# Patient Record
Sex: FEMALE | Race: WHITE | NOT HISPANIC OR LATINO | Employment: UNEMPLOYED | URBAN - NONMETROPOLITAN AREA
[De-identification: names, ages, dates, MRNs, and addresses within clinical notes are randomized per-mention and may not be internally consistent; named-entity substitution may affect disease eponyms.]

---

## 2017-02-27 RX ORDER — ATORVASTATIN CALCIUM 20 MG/1
TABLET, FILM COATED ORAL
Qty: 30 TABLET | Refills: 10 | Status: CANCELLED | OUTPATIENT
Start: 2017-02-27

## 2017-02-27 RX ORDER — ATORVASTATIN CALCIUM 20 MG/1
20 TABLET, FILM COATED ORAL DAILY
Qty: 30 TABLET | Refills: 6 | Status: SHIPPED | OUTPATIENT
Start: 2017-02-27 | End: 2017-10-09 | Stop reason: SDUPTHER

## 2017-03-14 ENCOUNTER — APPOINTMENT (OUTPATIENT)
Dept: LAB | Facility: HOSPITAL | Age: 52
End: 2017-03-14

## 2017-03-14 ENCOUNTER — OFFICE VISIT (OUTPATIENT)
Dept: PAIN MEDICINE | Facility: CLINIC | Age: 52
End: 2017-03-14

## 2017-03-14 VITALS
HEIGHT: 68 IN | SYSTOLIC BLOOD PRESSURE: 140 MMHG | DIASTOLIC BLOOD PRESSURE: 80 MMHG | WEIGHT: 226.6 LBS | BODY MASS INDEX: 34.34 KG/M2

## 2017-03-14 DIAGNOSIS — Z79.899 HIGH RISK MEDICATIONS (NOT ANTICOAGULANTS) LONG-TERM USE: ICD-10-CM

## 2017-03-14 DIAGNOSIS — M54.42 CHRONIC BILATERAL LOW BACK PAIN WITH LEFT-SIDED SCIATICA: Primary | ICD-10-CM

## 2017-03-14 DIAGNOSIS — G89.29 CHRONIC BILATERAL LOW BACK PAIN WITH LEFT-SIDED SCIATICA: Primary | ICD-10-CM

## 2017-03-14 DIAGNOSIS — M50.30 DDD (DEGENERATIVE DISC DISEASE), CERVICAL: ICD-10-CM

## 2017-03-14 PROCEDURE — G0481 DRUG TEST DEF 8-14 CLASSES: HCPCS | Performed by: PAIN MEDICINE

## 2017-03-14 PROCEDURE — 80307 DRUG TEST PRSMV CHEM ANLYZR: CPT | Performed by: PAIN MEDICINE

## 2017-03-14 PROCEDURE — 99204 OFFICE O/P NEW MOD 45 MIN: CPT | Performed by: PAIN MEDICINE

## 2017-03-14 RX ORDER — POTASSIUM CITRATE 15 MEQ/1
TABLET, EXTENDED RELEASE ORAL
COMMUNITY
Start: 2016-12-09 | End: 2017-05-25

## 2017-03-14 RX ORDER — HYDROCODONE BITARTRATE AND ACETAMINOPHEN 7.5; 325 MG/1; MG/1
1 TABLET ORAL 3 TIMES DAILY
Qty: 90 TABLET | Refills: 0 | Status: SHIPPED | OUTPATIENT
Start: 2017-03-14 | End: 2017-04-13

## 2017-03-14 RX ORDER — VENLAFAXINE 100 MG/1
TABLET ORAL
COMMUNITY
Start: 2017-02-27 | End: 2017-05-25

## 2017-03-14 RX ORDER — TAMSULOSIN HYDROCHLORIDE 0.4 MG/1
CAPSULE ORAL
COMMUNITY
Start: 2016-12-21 | End: 2017-05-25

## 2017-03-14 RX ORDER — LISINOPRIL 40 MG/1
40 TABLET ORAL DAILY
COMMUNITY
Start: 2017-02-10 | End: 2019-01-16

## 2017-03-14 RX ORDER — PROMETHAZINE HYDROCHLORIDE 25 MG/1
TABLET ORAL
COMMUNITY
Start: 2016-12-13 | End: 2017-05-25

## 2017-03-14 RX ORDER — CEFUROXIME AXETIL 250 MG/1
TABLET ORAL
COMMUNITY
Start: 2016-12-09 | End: 2017-05-25

## 2017-03-14 RX ORDER — ISOSORBIDE MONONITRATE 30 MG/1
TABLET, EXTENDED RELEASE ORAL
COMMUNITY
Start: 2017-02-10 | End: 2017-05-19 | Stop reason: SDUPTHER

## 2017-03-14 NOTE — PROGRESS NOTES
"Sena Romero is a 52 y.o. female.   1965    HPI:   Location: lower back and bilateral leg and neck  Quality: stabbing, shooting, aching and sharp  Severity: 6/10  Timing: constant  Alleviating: pain medication  Aggravating: increased activity    Several areas of pain.  Low back and left leg are worst.  States she has had pain going down the left leg for a couple of weeks.  The back pain has been present for about 20 years no injury involved.  She has neck pain and has an mri from 2014 which calls it \"severe canal stenosis\".  Has not seen surgeon about neck.  Her neck pain is isolated to axial pain without radicular sx's.  Is on plavix for cad.  Last had stents in 3/16.  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  Has seen [facility] in the past, but stopped because she did not feel as though they were helping her.  She receives opioid medication currently from her pcp.  She has been on opioids, in general, for about 5 years.  Denies being d/c'd for aberrant behavior.  Denies abberant UDS's in the past.      The following portions of the patient's history were reviewed by me and updated as appropriate: allergies, current medications, past family history, past medical history, past social history, past surgical history and problem list.    Past Medical History   Diagnosis Date   • Chronic back pain      And leg, DJD      • Chronic depression 09/18/2014     With anxiety   • Chronic pain disorder    • Coronary arteriosclerosis 09/18/2014     [place] NSTEMI      • Costal chondritis 07/02/2014   • Dyslipidemia    • Essential hypertension 06/17/2014   • GERD (gastroesophageal reflux disease)    • Hematoma of groin 03/13/2015   • Low back pain    • Obese 07/02/2014   • Pain of sternum 09/18/2014   • Pseudoaneurysm of femoral artery 03/13/2015     Post-catheterization femoral pseudoaneurysm     • Surgical follow-up care 09/18/2014     CABG X 3 " 3/23/14          Social History     Social History   • Marital status:      Spouse name: N/A   • Number of children: N/A   • Years of education: N/A     Occupational History   • Not on file.     Social History Main Topics   • Smoking status: Current Every Day Smoker     Packs/day: 0.50     Types: Cigarettes   • Smokeless tobacco: Not on file      Comment: Quit  3/1/15.  Smoked for 40 years   • Alcohol use No   • Drug use: No   • Sexual activity: Defer     Other Topics Concern   • Not on file     Social History Narrative       Family History   Problem Relation Age of Onset   • Diabetes Mother    • Mental illness Father    • COPD Sister    • Osteoporosis Sister    • Cancer Maternal Aunt    • Diabetes Maternal Aunt    • Diabetes Maternal Uncle    • Coronary artery disease Neg Hx          Current Outpatient Prescriptions:   •  ASPIRIN PO, Take 1 tablet by mouth daily., Disp: , Rfl:   •  atorvastatin (LIPITOR) 20 MG tablet, Take 1 tablet by mouth Daily., Disp: 30 tablet, Rfl: 6  •  carvedilol (COREG) 3.125 MG tablet, Take 3.125 mg by mouth 2 (two) times a day., Disp: , Rfl:   •  clonazePAM (KlonoPIN) 1 MG disintegrating tablet, Take 1 mg by mouth at night as needed., Disp: , Rfl:   •  clopidogrel (PLAVIX) 75 MG tablet, TAKE ONE TABLET BY MOUTH DAILY, Disp: 30 tablet, Rfl: 4  •  HYDROcodone-acetaminophen (NORCO) 7.5-325 MG per tablet, Take 1 tablet by mouth 4 (four) times a day as needed., Disp: , Rfl:   •  Isosorbide Mononitrate (IMDUR PO), Take 1 tablet by mouth daily., Disp: , Rfl:   •  latanoprost (XALATAN) 0.005 % ophthalmic solution, Apply 1 drop to eye every night., Disp: , Rfl:   •  lisinopril (PRINIVIL,ZESTRIL) 10 MG tablet, Take 10 mg by mouth 2 (two) times a day., Disp: , Rfl:   •  omeprazole (PriLOSEC) 20 MG capsule, , Disp: , Rfl:   •  spironolactone-hydrochlorothiazide (ALDACTAZIDE) 25-25 MG tablet, TAKE ONE TABLET BY MOUTH EVERY MORNING, Disp: 30 tablet, Rfl: 5  •  Tiotropium Bromide Monohydrate  (SPIRIVA HANDIHALER IN), Inhale daily as needed (1 inhalation as directed Inhalation PRN)., Disp: , Rfl:   •  traZODone (DESYREL) 50 MG tablet, , Disp: , Rfl:   •  venlafaxine (EFFEXOR) 75 MG tablet, , Disp: , Rfl:   •  vitamin D (ERGOCALCIFEROL) 04408 UNITS capsule capsule, , Disp: , Rfl:   •  cefuroxime (CEFTIN) 250 MG tablet, , Disp: , Rfl:   •  HYDROcodone-acetaminophen (NORCO) 7.5-325 MG per tablet, Take 1 tablet by mouth 3 (Three) Times a Day for 30 days., Disp: 90 tablet, Rfl: 0  •  HYDROcodone-acetaminophen (NORCO) 7.5-325 MG per tablet, Take 1 tablet by mouth 3 (Three) Times a Day for 30 days., Disp: 90 tablet, Rfl: 0  •  isosorbide mononitrate (IMDUR) 30 MG 24 hr tablet, , Disp: , Rfl:   •  lisinopril (PRINIVIL,ZESTRIL) 40 MG tablet, , Disp: , Rfl:   •  Potassium Citrate ER 15 MEQ (1620 MG) tablet controlled-release, , Disp: , Rfl:   •  promethazine (PHENERGAN) 25 MG tablet, , Disp: , Rfl:   •  tamsulosin (FLOMAX) 0.4 MG capsule 24 hr capsule, , Disp: , Rfl:   •  venlafaxine (EFFEXOR) 100 MG tablet, , Disp: , Rfl:   •  VENTOLIN  (90 BASE) MCG/ACT inhaler, , Disp: , Rfl:     No Known Allergies      Review of Systems   Respiratory:        Copd     Cardiovascular:        Cabg     Musculoskeletal: Positive for back pain.   Psychiatric/Behavioral: Positive for dysphoric mood. The patient is nervous/anxious.      10 system review of systems was reviewed and negative except for above.    Physical Exam   Constitutional: She is oriented to person, place, and time. She appears well-developed and well-nourished. She does not appear ill. No distress.   HENT:   Head: Normocephalic and atraumatic.   Right Ear: Hearing normal.   Left Ear: Hearing normal.   Eyes: Conjunctivae and EOM are normal. Pupils are equal, round, and reactive to light.   Neck: Full passive range of motion without pain. No muscular tenderness present. Normal range of motion present.   Cardiovascular: Normal rate, regular rhythm and normal  heart sounds.    Pulmonary/Chest: Effort normal and breath sounds normal.   Sternal defect palpable   Abdominal: Soft. Bowel sounds are normal. There is no tenderness.   Musculoskeletal:        Cervical back: She exhibits decreased range of motion (full flexion and ext with pain during ext. ).        Lumbar back: She exhibits decreased range of motion (60 deg flexion and 5-10 deg ext.  ).   Neurological: She is alert and oriented to person, place, and time. She has normal strength and normal reflexes. She displays normal reflexes. No cranial nerve deficit or sensory deficit. She exhibits normal muscle tone. Coordination and gait normal.   Pos slr on left   Skin: Skin is warm and dry. No rash noted. No erythema.   Psychiatric: She has a normal mood and affect. Her behavior is normal. Judgment normal.   Vitals reviewed.      Sena was seen today for back pain and extremity pain.    Diagnoses and all orders for this visit:    Chronic bilateral low back pain with left-sided sciatica  -     ToxASSURE Select 13 (MW)    DDD (degenerative disc disease), cervical  -     ToxASSURE Select 13 (MW)    High risk medications (not anticoagulants) long-term use  -     ToxASSURE Select 13 (MW)    Other orders  -     HYDROcodone-acetaminophen (NORCO) 7.5-325 MG per tablet; Take 1 tablet by mouth 3 (Three) Times a Day for 30 days.  -     HYDROcodone-acetaminophen (NORCO) 7.5-325 MG per tablet; Take 1 tablet by mouth 3 (Three) Times a Day for 30 days.        Medication: Patient reports no negative side effects, Patient reports appropriate usage and storage habits, Patient's opioid provides enough reflief to be more active and perform activities of daily living with less discomfort., Refill opioid medication as above and Opioid contract was read and discussed today and the patient chooses to sign.  Request old [facility] records to confirm no aberrant behavior.     Interventional: none at this time.  Chronically on plavix.  May revisit in  future.  Watchful waiting on the recent start of left leg pain.  Neurologically intact.  Informed of alarm symptoms for cauda equina syndrome and to seek emergent care if she experiences.     Rehab: none at this time.  Has had issues with this in past due to sternal non-union reportedly.    Behavioral: No aberrant behavior noted. JESENIA Report #[id number]  was reviewed and is consistent with stated history.  Treated for depression with ssri and sees counseling at [facility] every two months.  States she feels as though her depression is well treated and denies any SI.    Urine drug screen Ordered today to test for drugs of abuse and prescribed medications    ORT: 1    PHQ-9: 9          This document has been electronically signed by Sundeep Gerard MD on March 14, 2017 8:59 AM

## 2017-03-20 LAB
CONV REPORT SUMMARY: NORMAL
Lab: NORMAL

## 2017-05-01 RX ORDER — CLOPIDOGREL BISULFATE 75 MG/1
75 TABLET ORAL DAILY
Qty: 30 TABLET | Refills: 4 | Status: SHIPPED | OUTPATIENT
Start: 2017-05-01 | End: 2017-10-09 | Stop reason: SDUPTHER

## 2017-05-01 RX ORDER — CLOPIDOGREL BISULFATE 75 MG/1
TABLET ORAL
Qty: 30 TABLET | Refills: 3 | Status: CANCELLED | OUTPATIENT
Start: 2017-05-01

## 2017-05-19 RX ORDER — ISOSORBIDE MONONITRATE 30 MG/1
TABLET, EXTENDED RELEASE ORAL
Qty: 30 TABLET | Refills: 4 | Status: SHIPPED | OUTPATIENT
Start: 2017-05-19 | End: 2017-06-20

## 2017-05-19 RX ORDER — SPIRONOLACTONE AND HYDROCHLOROTHIAZIDE 25; 25 MG/1; MG/1
TABLET ORAL
Qty: 30 TABLET | Refills: 4 | Status: SHIPPED | OUTPATIENT
Start: 2017-05-19 | End: 2017-05-25

## 2017-05-25 ENCOUNTER — OFFICE VISIT (OUTPATIENT)
Dept: ENDOCRINOLOGY | Facility: CLINIC | Age: 52
End: 2017-05-25

## 2017-05-25 VITALS
HEIGHT: 68 IN | SYSTOLIC BLOOD PRESSURE: 138 MMHG | WEIGHT: 216 LBS | HEART RATE: 58 BPM | BODY MASS INDEX: 32.74 KG/M2 | DIASTOLIC BLOOD PRESSURE: 80 MMHG

## 2017-05-25 DIAGNOSIS — E55.9 VITAMIN D DEFICIENCY: ICD-10-CM

## 2017-05-25 DIAGNOSIS — E21.0 PRIMARY HYPERPARATHYROIDISM (HCC): Primary | ICD-10-CM

## 2017-05-25 DIAGNOSIS — N20.0 NEPHROLITHIASIS: ICD-10-CM

## 2017-05-25 PROBLEM — I25.810 CORONARY ARTERY DISEASE INVOLVING CORONARY BYPASS GRAFT OF NATIVE HEART WITHOUT ANGINA PECTORIS: Status: ACTIVE | Noted: 2017-05-25

## 2017-05-25 PROCEDURE — 99245 OFF/OP CONSLTJ NEW/EST HI 55: CPT | Performed by: INTERNAL MEDICINE

## 2017-05-25 RX ORDER — SPIRONOLACTONE 25 MG/1
25 TABLET ORAL DAILY
Qty: 30 TABLET | Refills: 11 | Status: SHIPPED | OUTPATIENT
Start: 2017-05-25 | End: 2018-05-25

## 2017-05-25 RX ORDER — ERGOCALCIFEROL 1.25 MG/1
50000 CAPSULE ORAL
COMMUNITY
End: 2017-06-20

## 2017-06-20 ENCOUNTER — OFFICE VISIT (OUTPATIENT)
Dept: CARDIOLOGY | Facility: CLINIC | Age: 52
End: 2017-06-20

## 2017-06-20 VITALS
SYSTOLIC BLOOD PRESSURE: 170 MMHG | HEART RATE: 66 BPM | BODY MASS INDEX: 33.19 KG/M2 | WEIGHT: 219 LBS | DIASTOLIC BLOOD PRESSURE: 102 MMHG | HEIGHT: 68 IN

## 2017-06-20 DIAGNOSIS — I10 ESSENTIAL HYPERTENSION: ICD-10-CM

## 2017-06-20 DIAGNOSIS — I25.708 CORONARY ARTERY DISEASE OF BYPASS GRAFT OF NATIVE HEART WITH STABLE ANGINA PECTORIS (HCC): Primary | ICD-10-CM

## 2017-06-20 DIAGNOSIS — E78.00 PURE HYPERCHOLESTEROLEMIA: ICD-10-CM

## 2017-06-20 DIAGNOSIS — J44.9 CHRONIC OBSTRUCTIVE PULMONARY DISEASE, UNSPECIFIED COPD TYPE (HCC): ICD-10-CM

## 2017-06-20 DIAGNOSIS — Z01.810 PRE-OPERATIVE CARDIOVASCULAR EXAMINATION: ICD-10-CM

## 2017-06-20 PROCEDURE — 99214 OFFICE O/P EST MOD 30 MIN: CPT | Performed by: INTERNAL MEDICINE

## 2017-06-20 PROCEDURE — 93000 ELECTROCARDIOGRAM COMPLETE: CPT | Performed by: INTERNAL MEDICINE

## 2017-06-20 RX ORDER — GABAPENTIN 300 MG/1
300 CAPSULE ORAL 2 TIMES DAILY
COMMUNITY
End: 2019-01-16

## 2017-06-20 RX ORDER — ISOSORBIDE MONONITRATE 30 MG/1
30 TABLET, EXTENDED RELEASE ORAL DAILY
COMMUNITY
End: 2017-06-20

## 2017-06-20 RX ORDER — ISOSORBIDE MONONITRATE 60 MG/1
60 TABLET, EXTENDED RELEASE ORAL DAILY
Qty: 30 TABLET | Refills: 12 | Status: SHIPPED | OUTPATIENT
Start: 2017-06-20 | End: 2017-08-04 | Stop reason: SDUPTHER

## 2017-06-20 RX ORDER — PREGABALIN 100 MG/1
100 CAPSULE ORAL 3 TIMES DAILY
COMMUNITY
End: 2017-08-04

## 2017-06-20 RX ORDER — ISOSORBIDE MONONITRATE 30 MG/1
60 TABLET, EXTENDED RELEASE ORAL DAILY
Qty: 30 TABLET | Refills: 6 | COMMUNITY
Start: 2017-06-20 | End: 2017-06-20 | Stop reason: SDUPTHER

## 2017-06-20 NOTE — PROGRESS NOTES
Sena Romero  52 y.o. female      1. Coronary artery disease of bypass graft of native heart with stable angina pectoris    2. Essential hypertension    3. Pure hypercholesterolemia    4. Chronic obstructive pulmonary disease, unspecified COPD type    5. Pre-operative cardiovascular examination        Chief complaint - preop thyroid surgery      History of present [id number]-old lady who has history of CABG with triple-vessel bypass in  still has sternal tenderness due to nonunion of sternum and she has been is smoker for long-time and has quit for the past 8 months.  She is planning to thyroid surgery.  She denies any nausea or vomiting no TIA symptoms and her blood pressure is high.  I increased the isosorbide to 60 mg daily for better control blood pressure and HCTZ was taken off by Dr. Mehdi Anderson.          No Known Allergies      Past Medical History:   Diagnosis Date   • Chronic back pain     And leg, DJD      • Chronic depression 2014    With anxiety   • Chronic pain disorder    • Coronary arteriosclerosis 2014    [place] NSTEMI      • Costal chondritis 2014   • Dyslipidemia    • Essential hypertension 2014   • GERD (gastroesophageal reflux disease)    • Hematoma of groin 2015   • Low back pain    • Obese 2014   • Pain of sternum 2014   • Pseudoaneurysm of femoral artery 2015    Post-catheterization femoral pseudoaneurysm     • Surgical follow-up care 2014    CABG X 3 3/23/14      • Vitamin D deficiency 2017         Past Surgical History:   Procedure Laterality Date   • BACK SURGERY     • CARDIAC CATHETERIZATION  2015    Cardiac cath 28231 (2)      •  SECTION       Section (1)      • CHOLECYSTECTOMY      Cholecystectomy (1)      • CORONARY ARTERY BYPASS GRAFT  2014    CABG (1)      • HYSTERECTOMY      Anesth, hysterectomy (1)      • INJECTION OF MEDICATION  2015    Percu Tx of Extrem Pseudoaneurysm US guided  26442 (1)            Family History   Problem Relation Age of Onset   • Diabetes Mother    • Mental illness Father    • COPD Sister    • Osteoporosis Sister    • Cancer Maternal Aunt    • Diabetes Maternal Aunt    • Diabetes Maternal Uncle    • Coronary artery disease Neg Hx          Social History     Social History   • Marital status:      Spouse name: N/A   • Number of children: N/A   • Years of education: N/A     Occupational History   • Not on file.     Social History Main Topics   • Smoking status: Former Smoker     Packs/day: 0.50     Types: Cigarettes     Quit date: 2017   • Smokeless tobacco: Never Used      Comment: Quit  3/1/15.  Smoked for 40 years   • Alcohol use No   • Drug use: No   • Sexual activity: Defer     Other Topics Concern   • Not on file     Social History Narrative         Current Outpatient Prescriptions   Medication Sig Dispense Refill   • ASPIRIN PO Take 81 mg by mouth Daily.     • atorvastatin (LIPITOR) 20 MG tablet Take 1 tablet by mouth Daily. 30 tablet 6   • Calcium Citrate 200 MG tablet 400 mg daily 60 each 5   • clopidogrel (PLAVIX) 75 MG tablet Take 1 tablet by mouth Daily. 30 tablet 4   • Ergocalciferol (VITAMIN D2 PO) Take 1.25 mg by mouth Every 30 (Thirty) Days.     • gabapentin (NEURONTIN) 300 MG capsule Take 300 mg by mouth 2 (Two) Times a Day.     • HYDROcodone-acetaminophen (NORCO) 7.5-325 MG per tablet Take 1 tablet by mouth 4 (four) times a day as needed.     • isosorbide mononitrate (IMDUR) 60 MG 24 hr tablet Take 1 tablet by mouth Daily. 30 tablet 12   • lisinopril (PRINIVIL,ZESTRIL) 40 MG tablet Take 40 mg by mouth Daily.     • omeprazole (PriLOSEC) 20 MG capsule Take 20 mg by mouth Daily.     • pregabalin (LYRICA) 100 MG capsule Take 100 mg by mouth 3 (Three) Times a Day.     • spironolactone (ALDACTONE) 25 MG tablet Take 1 tablet by mouth Daily. 30 tablet 11   • Tiotropium Bromide Monohydrate (SPIRIVA HANDIHALER IN) Inhale daily as needed (1 inhalation as  "directed Inhalation PRN).     • traZODone (DESYREL) 50 MG tablet Take 50 mg by mouth Every Night.     • venlafaxine (EFFEXOR) 75 MG tablet Take 75 mg by mouth 2 (Two) Times a Day.     • VENTOLIN  (90 BASE) MCG/ACT inhaler        No current facility-administered medications for this visit.          Review of Systems     Constitution: Denies any fatigue, fever or chills    HENT: Denies any headache, hearing impairment, nasal discharge or sore throat    Eyes: Denies any blurring of vision, or photophobia     Cardivascular - As per history of present illness     Respiratory system-has COPD with shortness of breath,no sleep apnea.     Endocrine: history of hyperlipidemia, no diabetes       Musculoskeletal:   history of arthritis, musculoskeletal problems of the back    Gastrointestinal: No nausea, vomiting, or melena    Genitourinary: No dysuria or hematuria    Neurological:   No history of seizure disorder, stroke, memory problems    Psychiatric/Behavioral:         history of depression and anxiety but no history of bipolar disorder or schizophrenia     Hematological- no history of easy bruising or any bleeding diathesis            OBJECTIVE    BP (!) 170/102  Pulse 66  Ht 68\" (172.7 cm)  Wt 219 lb (99.3 kg)  BMI 33.3 kg/m2      Physical Exam     Constitutional: is oriented to person, place, and time.     Skin-warm and dry    Well developed and nourished in no acute distress      Head: Normocephalic and atraumatic.     Eyes: Pupils are equal, round, and reactive to light.     Neck: Neck supple. No bruit in the carotids, no elevation of JVD    Cardiovascular: [place] in the fifth intercostal space, regular rate, and  Rhythm,  S1 greater than S2, no S3 or S4, no gallop       Pulmonary/Chest:   Air  Entry is equal on both sides  Decreased all areas      Abdominal: Soft.  No hepatosplenomegaly, bowel sounds are present    Musculoskeletal: No kyphoscoliosis, no significant thickening of the joints    Neurological: is " alert and oriented to person, place, and time.    cranial nerve are intact .   No motor or sensory deficit    Extremities-no edema,  pulses are felt equally on both sides, no radial femoral delay      Psychiatric: He has a normal mood and affect.                  His behavior is normal.             ECG 12 Lead  Date/Time: 6/20/2017 2:45 PM  Performed by: SONIDO ALONSO  Authorized by: SONIDO ALONSO   Comparison: not compared with previous ECG   Rhythm: sinus rhythm  Clinical impression: normal ECG              Lab Results   Component Value Date    WBC 6.7 03/03/2015    HGB 12.7 03/03/2015    HCT 38.4 03/03/2015    MCV 90.0 03/03/2015     03/03/2015     Lab Results   Component Value Date    GLUCOSE 109 (H) 03/03/2015    BUN 14 03/03/2015    CREATININE 0.8 03/03/2015    CO2 26 03/03/2015    CALCIUM 9.8 03/03/2015    ALBUMIN 3.7 03/02/2015    AST 38 (H) 03/02/2015    ALT 36 03/02/2015     No results found for: CHOL  Lab Results   Component Value Date    TRIG 184 03/03/2015    TRIG 134 03/02/2015     Lab Results   Component Value Date    HDL 33 (L) 03/03/2015    HDL 42 (L) 03/02/2015     Lab Results   Component Value Date    LDLCALC 104 03/03/2015    LDLCALC 110 03/02/2015     No results found for: LDL  No results found for: HDLLDLRATIO  No components found for: CHOLHDL  Lab Results   Component Value Date    HGBA1C 5.5 03/22/2014     Lab Results   Component Value Date    TSH 1.29 03/02/2015                  A/P    CAD status post CABG with triple-vessel bypass with LIMA to LAD, vein graft to the diagonal and vein graft to the PDA in March 2014, she needs thyroid surgery and she can proceed with moderate risk by ACC AHA guidelines and she does not need any further cardiac workup.  Her EKG is normal      COPD with dyspnea on exertion NYHA class II stable    Hypertension not well controlled with lisinopril, Coreg and isosorbide.and Aldactaone    Depression and anxiety on Effexor and Klonopin and she is  seeing Dr. Montiel at [facility].          Lj Oconnell MD  6/20/2017  2:45 PM       Ms. Romero stress test showed mild to moderate anteroapical reversibility, she has history of coronary disease status post CABG and her LIMA was patent by cardiac catheterization a year ago.  Since it was patent and could be related to breast attenuation and it's the low to intermediate risk study, she can proceed with back surgery with moderate risk by ACC/AHA guidelines.  She does not need any further cardiac workup.

## 2017-06-26 ENCOUNTER — LAB (OUTPATIENT)
Dept: LAB | Facility: HOSPITAL | Age: 52
End: 2017-06-26
Attending: INTERNAL MEDICINE

## 2017-06-26 ENCOUNTER — HOSPITAL ENCOUNTER (OUTPATIENT)
Dept: NUCLEAR MEDICINE | Facility: HOSPITAL | Age: 52
Discharge: HOME | End: 2017-06-26

## 2017-06-26 ENCOUNTER — HOSPITAL ENCOUNTER (OUTPATIENT)
Dept: ULTRASOUND IMAGING | Facility: HOSPITAL | Age: 52
Discharge: HOME | End: 2017-06-26
Admitting: INTERNAL MEDICINE

## 2017-06-26 ENCOUNTER — TRANSCRIBE ORDERS (OUTPATIENT)
Dept: LAB | Facility: HOSPITAL | Age: 52
End: 2017-06-26

## 2017-06-26 DIAGNOSIS — N20.0 URIC ACID NEPHROLITHIASIS: Primary | ICD-10-CM

## 2017-06-26 DIAGNOSIS — N20.0 URIC ACID NEPHROLITHIASIS: ICD-10-CM

## 2017-06-26 DIAGNOSIS — E21.0 PRIMARY HYPERPARATHYROIDISM (HCC): ICD-10-CM

## 2017-06-26 LAB
25(OH)D3 SERPL-MCNC: 32.7 NG/ML (ref 30–100)
ALBUMIN SERPL-MCNC: 4.8 G/DL (ref 3.4–4.8)
ANION GAP SERPL CALCULATED.3IONS-SCNC: 14 MMOL/L (ref 5–15)
BUN BLD-MCNC: 14 MG/DL (ref 7–21)
BUN/CREAT SERPL: 15.6 (ref 7–25)
CALCIUM SPEC-SCNC: 11 MG/DL (ref 8.4–10.2)
CHLORIDE SERPL-SCNC: 101 MMOL/L (ref 95–110)
CO2 SERPL-SCNC: 26 MMOL/L (ref 22–31)
CREAT BLD-MCNC: 0.9 MG/DL (ref 0.5–1)
GFR SERPL CREATININE-BSD FRML MDRD: 66 ML/MIN/1.73 (ref 60–120)
GLUCOSE BLD-MCNC: 97 MG/DL (ref 60–100)
PHOSPHATE SERPL-MCNC: 2.7 MG/DL (ref 2.4–4.4)
POTASSIUM BLD-SCNC: 4.5 MMOL/L (ref 3.5–5.1)
SODIUM BLD-SCNC: 141 MMOL/L (ref 137–145)
TSH SERPL DL<=0.05 MIU/L-ACNC: 1.1 MIU/ML (ref 0.46–4.68)

## 2017-06-26 PROCEDURE — 80069 RENAL FUNCTION PANEL: CPT

## 2017-06-26 PROCEDURE — A9500 TC99M SESTAMIBI: HCPCS | Performed by: INTERNAL MEDICINE

## 2017-06-26 PROCEDURE — 0 TECHNETIUM SESTAMIBI: Performed by: INTERNAL MEDICINE

## 2017-06-26 PROCEDURE — 83937 ASSAY OF OSTEOCALCIN: CPT

## 2017-06-26 PROCEDURE — 78071 PARATHYRD PLANAR W/WO SUBTRJ: CPT

## 2017-06-26 PROCEDURE — 83970 ASSAY OF PARATHORMONE: CPT

## 2017-06-26 PROCEDURE — 36415 COLL VENOUS BLD VENIPUNCTURE: CPT

## 2017-06-26 PROCEDURE — 82523 COLLAGEN CROSSLINKS: CPT

## 2017-06-26 PROCEDURE — 76536 US EXAM OF HEAD AND NECK: CPT

## 2017-06-26 PROCEDURE — 82306 VITAMIN D 25 HYDROXY: CPT

## 2017-06-26 PROCEDURE — 84443 ASSAY THYROID STIM HORMONE: CPT | Performed by: INTERNAL MEDICINE

## 2017-06-26 RX ADMIN — Medication 1 DOSE: at 12:28

## 2017-06-28 LAB
CALCIUM 24H UR-MCNC: 13.8 MG/DL
CALCIUM 24H UR-MRATE: 262.2 MG/24 HR (ref 100–300)
COLLAGEN NTX SER-SCNC: 18 NMOL BCE/L (ref 6.2–19)
COLLECT DURATION TIME UR: 24 HRS
CREAT UR-MCNC: 83.2 MG/DL
CREATINE 24H UR-MRATE: 1.58 G/24 HR (ref 0.8–2.8)
OSTEOCALCIN SERPL-MCNC: 36.4 NG/ML
PTH-INTACT SERPL-MCNC: 263.5 PG/ML (ref 10–65)
SODIUM 24H UR-SRATE: 296 MMOL/24HRS (ref 40–220)
SODIUM UR-SCNC: 156 MMOL/L (ref 30–90)
SPECIMEN VOL 24H UR: 1900 ML
URATE 24H UR-MRATE: 781 MG/24 HR (ref 250–750)
URATE UR-MCNC: 41.1 MG/DL

## 2017-06-28 PROCEDURE — 82507 ASSAY OF CITRATE: CPT | Performed by: INTERNAL MEDICINE

## 2017-06-28 PROCEDURE — 81050 URINALYSIS VOLUME MEASURE: CPT | Performed by: INTERNAL MEDICINE

## 2017-06-28 PROCEDURE — 82570 ASSAY OF URINE CREATININE: CPT | Performed by: INTERNAL MEDICINE

## 2017-06-28 PROCEDURE — 82340 ASSAY OF CALCIUM IN URINE: CPT | Performed by: INTERNAL MEDICINE

## 2017-06-28 PROCEDURE — 84560 ASSAY OF URINE/URIC ACID: CPT | Performed by: INTERNAL MEDICINE

## 2017-06-28 PROCEDURE — 84300 ASSAY OF URINE SODIUM: CPT | Performed by: INTERNAL MEDICINE

## 2017-06-28 PROCEDURE — 83945 ASSAY OF OXALATE: CPT | Performed by: INTERNAL MEDICINE

## 2017-06-30 LAB
CITRATE 24H UR-MCNC: 203 MG/L
CITRATE 24H UR-MCNC: 386 MG/24 HR (ref 320–1240)
OXALATE UR-MCNC: 18 MG/L
OXALATES, URINE 24HR: 34 MG/24 HR (ref 4–31)

## 2017-07-12 ENCOUNTER — OFFICE VISIT (OUTPATIENT)
Dept: ENDOCRINOLOGY | Facility: CLINIC | Age: 52
End: 2017-07-12

## 2017-07-12 VITALS
DIASTOLIC BLOOD PRESSURE: 100 MMHG | WEIGHT: 225.4 LBS | HEART RATE: 87 BPM | SYSTOLIC BLOOD PRESSURE: 140 MMHG | BODY MASS INDEX: 34.16 KG/M2 | HEIGHT: 68 IN

## 2017-07-12 DIAGNOSIS — E55.9 VITAMIN D DEFICIENCY: ICD-10-CM

## 2017-07-12 DIAGNOSIS — E21.0 PRIMARY HYPERPARATHYROIDISM (HCC): Primary | ICD-10-CM

## 2017-07-12 DIAGNOSIS — E04.1 SOLITARY THYROID NODULE: ICD-10-CM

## 2017-07-12 DIAGNOSIS — N20.0 NEPHROLITHIASIS: ICD-10-CM

## 2017-07-12 DIAGNOSIS — I10 ESSENTIAL HYPERTENSION: ICD-10-CM

## 2017-07-12 PROCEDURE — 99214 OFFICE O/P EST MOD 30 MIN: CPT | Performed by: NURSE PRACTITIONER

## 2017-07-12 RX ORDER — CARVEDILOL 3.12 MG/1
6.25 TABLET ORAL 2 TIMES DAILY
COMMUNITY
Start: 2017-06-23 | End: 2018-02-14

## 2017-07-12 RX ORDER — ISOSORBIDE MONONITRATE 30 MG/1
30 TABLET, EXTENDED RELEASE ORAL DAILY
COMMUNITY
Start: 2017-06-23 | End: 2017-08-04 | Stop reason: DRUGHIGH

## 2017-07-12 RX ORDER — ERGOCALCIFEROL 1.25 MG/1
CAPSULE ORAL
COMMUNITY
Start: 2017-06-21 | End: 2017-08-04 | Stop reason: SDUPTHER

## 2017-07-12 RX ORDER — CYCLOBENZAPRINE HCL 10 MG
10 TABLET ORAL 3 TIMES DAILY PRN
COMMUNITY
Start: 2017-06-22 | End: 2019-01-16

## 2017-07-12 NOTE — PROGRESS NOTES
Subjective    Sena Romero is a 52 y.o. female. she is here today for follow-up.    History of Present Illness     Reason - primary hyperparathyroidism       Duration documented Nov 2016     Timing - hypercalcemia is constant     Quality -  Calcium from Nov 22, 2016 elevated at 11.7 and PTH elevated at 115     Severity -  Associated complications include nephrolithiasis      Complications - nephrolithiasis      Current symptoms/problems  weight gain, back pain       Alleviating Factors: vit D use      Side Effects  none         Evaluation history:  TSH   Date Value Ref Range Status   06/26/2017 1.100 0.460 - 4.680 mIU/mL Final       Current medications:  Current Outpatient Prescriptions   Medication Sig Dispense Refill   • ASPIRIN PO Take 81 mg by mouth Daily.     • atorvastatin (LIPITOR) 20 MG tablet Take 1 tablet by mouth Daily. 30 tablet 6   • Calcium Citrate 200 MG tablet 400 mg daily 60 each 5   • carvedilol (COREG) 3.125 MG tablet Take 3.125 mg by mouth 2 (Two) Times a Day.     • clopidogrel (PLAVIX) 75 MG tablet Take 1 tablet by mouth Daily. 30 tablet 4   • cyclobenzaprine (FLEXERIL) 10 MG tablet Take 10 mg by mouth 3 (Three) Times a Day.     • Ergocalciferol (VITAMIN D2 PO) Take 1.25 mg by mouth Every 30 (Thirty) Days.     • gabapentin (NEURONTIN) 300 MG capsule Take 300 mg by mouth 2 (Two) Times a Day.     • HYDROcodone-acetaminophen (NORCO) 7.5-325 MG per tablet Take 1 tablet by mouth 4 (four) times a day as needed.     • isosorbide mononitrate (IMDUR) 30 MG 24 hr tablet Take 30 mg by mouth Daily.     • isosorbide mononitrate (IMDUR) 60 MG 24 hr tablet Take 1 tablet by mouth Daily. 30 tablet 12   • lisinopril (PRINIVIL,ZESTRIL) 40 MG tablet Take 40 mg by mouth Daily.     • omeprazole (PriLOSEC) 20 MG capsule Take 20 mg by mouth Daily.     • pregabalin (LYRICA) 100 MG capsule Take 100 mg by mouth 3 (Three) Times a Day.     • spironolactone (ALDACTONE) 25 MG tablet Take 1 tablet by mouth Daily. 30 tablet  11   • Tiotropium Bromide Monohydrate (SPIRIVA HANDIHALER IN) Inhale daily as needed (1 inhalation as directed Inhalation PRN).     • traZODone (DESYREL) 50 MG tablet Take 50 mg by mouth Every Night.     • venlafaxine (EFFEXOR) 75 MG tablet Take 75 mg by mouth 2 (Two) Times a Day.     • VENTOLIN  (90 BASE) MCG/ACT inhaler      • vitamin D (ERGOCALCIFEROL) 30416 UNITS capsule capsule        No current facility-administered medications for this visit.        The following portions of the patient's history were reviewed and updated as appropriate:   Past Medical History:   Diagnosis Date   • Chronic back pain     And leg, DJD      • Chronic depression 2014    With anxiety   • Chronic pain disorder    • Coronary arteriosclerosis 2014    [place] NSTEMI      • Costal chondritis 2014   • Dyslipidemia    • Essential hypertension 2014   • GERD (gastroesophageal reflux disease)    • Hematoma of groin 2015   • Low back pain    • Obese 2014   • Pain of sternum 2014   • Pseudoaneurysm of femoral artery 2015    Post-catheterization femoral pseudoaneurysm     • Surgical follow-up care 2014    CABG X 3 3/23/14      • Vitamin D deficiency 2017     Past Surgical History:   Procedure Laterality Date   • BACK SURGERY     • CARDIAC CATHETERIZATION  2015    Cardiac cath 21108 (2)      •  SECTION       Section (1)      • CHOLECYSTECTOMY      Cholecystectomy (1)      • CORONARY ARTERY BYPASS GRAFT  2014    CABG (1)      • HYSTERECTOMY      Anesth, hysterectomy (1)      • INJECTION OF MEDICATION  2015    Percu Tx of Extrem Pseudoaneurysm US guided 60921 (1)        Family History   Problem Relation Age of Onset   • Diabetes Mother    • Mental illness Father    • COPD Sister    • Osteoporosis Sister    • Cancer Maternal Aunt    • Diabetes Maternal Aunt    • Diabetes Maternal Uncle    • Coronary artery disease Neg Hx      OB History     No data  available        No Known Allergies  Social History     Social History   • Marital status:      Spouse name: N/A   • Number of children: N/A   • Years of education: N/A     Social History Main Topics   • Smoking status: Former Smoker     Packs/day: 0.50     Types: Cigarettes     Quit date: 2017   • Smokeless tobacco: Never Used      Comment: Quit  3/1/15.  Smoked for 40 years   • Alcohol use No   • Drug use: No   • Sexual activity: Defer     Other Topics Concern   • None     Social History Narrative       Review of Systems  Review of Systems   Constitutional: Negative for activity change, appetite change, chills, diaphoresis and fatigue.   HENT: Negative for congestion, dental problem, drooling, ear discharge, ear pain, facial swelling, sneezing, sore throat, tinnitus, trouble swallowing and voice change.    Eyes: Negative for photophobia, pain, discharge, redness, itching and visual disturbance.   Respiratory: Negative for apnea, cough, choking, chest tightness and shortness of breath.    Cardiovascular: Negative for chest pain, palpitations and leg swelling.   Gastrointestinal: Negative for abdominal distention, abdominal pain, constipation, diarrhea, nausea and vomiting.   Endocrine: Negative for cold intolerance, heat intolerance, polydipsia, polyphagia and polyuria.   Genitourinary: Negative for difficulty urinating, dysuria, frequency, hematuria and urgency.   Musculoskeletal: Negative for arthralgias, back pain, gait problem, joint swelling, myalgias, neck pain and neck stiffness.   Skin: Negative for color change, pallor, rash and wound.   Allergic/Immunologic: Negative for environmental allergies, food allergies and immunocompromised state.   Neurological: Negative for dizziness, tremors, facial asymmetry, weakness, light-headedness, numbness and headaches.   Hematological: Negative for adenopathy. Does not bruise/bleed easily.   Psychiatric/Behavioral: Negative for agitation, behavioral problems,  "confusion, decreased concentration and sleep disturbance.        Objective    /100 (BP Location: Right arm, Patient Position: Sitting, Cuff Size: Adult)  Pulse 87  Ht 68\" (172.7 cm)  Wt 225 lb 6.4 oz (102 kg)  BMI 34.27 kg/m2  Physical Exam   Constitutional: She is oriented to person, place, and time. She appears well-developed and well-nourished. No distress.   HENT:   Head: Normocephalic and atraumatic.   Right Ear: External ear normal.   Left Ear: External ear normal.   Nose: Nose normal.   Eyes: Conjunctivae and EOM are normal. Pupils are equal, round, and reactive to light.   Neck: Normal range of motion. Neck supple. No tracheal deviation present. No thyromegaly present.   Cardiovascular: Normal rate, regular rhythm and normal heart sounds.    No murmur heard.  Pulmonary/Chest: Effort normal and breath sounds normal. No respiratory distress. She has no wheezes.   Abdominal: Soft. Bowel sounds are normal. There is no tenderness. There is no rebound and no guarding.   Musculoskeletal: Normal range of motion. She exhibits no edema, tenderness or deformity.   Neurological: She is alert and oriented to person, place, and time. No cranial nerve deficit.   Skin: Skin is warm and dry. No rash noted.   Psychiatric: She has a normal mood and affect. Her behavior is normal. Judgment and thought content normal.       Lab Review  Lab Results   Component Value Date    TSH 1.100 06/26/2017     No results found for: FREET4     Assessment/Plan      1. Primary hyperparathyroidism    2. Nephrolithiasis    3. Essential hypertension    4. Vitamin D deficiency    5. Solitary thyroid nodule    . This diagnosis was discussed and reviewed with the patient including the advantages of drug therapy.     1. Orders placed during this encounter include:  Orders Placed This Encounter   Procedures   • Renal Function Panel   • Vitamin D 25 Hydroxy   • PTH, Intact & Calcium   • TSH   • Ambulatory Referral to General Surgery     " Referral Priority:   Routine     Referral Type:   Consultation     Referral Reason:   Specialty Services Required     Referred to Provider:   Jason England MD     Requested Specialty:   General Surgery     Number of Visits Requested:   1       Medications prescribed:  Outpatient Encounter Prescriptions as of 7/12/2017   Medication Sig Dispense Refill   • ASPIRIN PO Take 81 mg by mouth Daily.     • atorvastatin (LIPITOR) 20 MG tablet Take 1 tablet by mouth Daily. 30 tablet 6   • Calcium Citrate 200 MG tablet 400 mg daily 60 each 5   • carvedilol (COREG) 3.125 MG tablet Take 3.125 mg by mouth 2 (Two) Times a Day.     • clopidogrel (PLAVIX) 75 MG tablet Take 1 tablet by mouth Daily. 30 tablet 4   • cyclobenzaprine (FLEXERIL) 10 MG tablet Take 10 mg by mouth 3 (Three) Times a Day.     • Ergocalciferol (VITAMIN D2 PO) Take 1.25 mg by mouth Every 30 (Thirty) Days.     • gabapentin (NEURONTIN) 300 MG capsule Take 300 mg by mouth 2 (Two) Times a Day.     • HYDROcodone-acetaminophen (NORCO) 7.5-325 MG per tablet Take 1 tablet by mouth 4 (four) times a day as needed.     • isosorbide mononitrate (IMDUR) 30 MG 24 hr tablet Take 30 mg by mouth Daily.     • isosorbide mononitrate (IMDUR) 60 MG 24 hr tablet Take 1 tablet by mouth Daily. 30 tablet 12   • lisinopril (PRINIVIL,ZESTRIL) 40 MG tablet Take 40 mg by mouth Daily.     • omeprazole (PriLOSEC) 20 MG capsule Take 20 mg by mouth Daily.     • pregabalin (LYRICA) 100 MG capsule Take 100 mg by mouth 3 (Three) Times a Day.     • spironolactone (ALDACTONE) 25 MG tablet Take 1 tablet by mouth Daily. 30 tablet 11   • Tiotropium Bromide Monohydrate (SPIRIVA HANDIHALER IN) Inhale daily as needed (1 inhalation as directed Inhalation PRN).     • traZODone (DESYREL) 50 MG tablet Take 50 mg by mouth Every Night.     • venlafaxine (EFFEXOR) 75 MG tablet Take 75 mg by mouth 2 (Two) Times a Day.     • VENTOLIN  (90 BASE) MCG/ACT inhaler      • vitamin D (ERGOCALCIFEROL) 96085 UNITS  capsule capsule        No facility-administered encounter medications on file as of 7/12/2017.        Primary hyperparathyroid    Elevated calcium and PTH  Complicated by nephrolithiasis     Obtain DXA and localizing studies--has not had the DXA         HISTORY: Primary hyperparathyroidism.     Following the intravenous infusion of 21.3 mCi of technetium 99  sestamibi images of the neck and upper chest are obtained. Both  planar and SPECT images are obtained in addition SPECT-CT fusion  images are also obtained. Images obtained both immediately and  following delay.     There is physiological uptake in the thyroid and salivary glands  on delayed images. On delayed images there is washout of activity  but persistent activity in the area adjacent to the lower pole  left lobe of thyroid. This is therefore suspicious for  parathyroid adenoma.     CT fusion images demonstrate evidence of prior coronary artery  bypass surgery.     IMPRESSION:  CONCLUSION: Persistent focus of increased uptake towards the  lower pole left lobe of thyroid suspicious for parathyroid  adenoma.       Repeat calcium      Restart vit D at 50 th u monthly     Take calcium citrate 500 mg daily to prevent hungry bone syndrome post  Parathyroidectomy      Component      Latest Ref Rng & Units 6/26/2017   PTH, Intact      10.0 - 65.0 pg/mL 263.5 (H)     Lab Results   Component Value Date    CALCIUM 11.0 (H) 06/26/2017    PHOS 2.7 06/26/2017            Stop aldactazide, take only aldactone 25 mg daily , obtain bmp along all of workup in 2 weeks to monitor not only calcium but K since I stopped hctz --potassium normal      Obtain 24 h urine for calcium    Component      Latest Ref Rng & Units 6/28/2017 6/28/2017 6/28/2017 6/28/2017          11:35 AM 11:35 AM 11:35 AM 11:35 AM   Creatinine, 24H      0.80 - 2.80 g/24 hr   1.58    Creatinine, Urine      mg/dL   83.2    Urine Volume      mL 1900 1900 1900 1900   Time (Hours)      hrs 24 24 24 24   Calcium,  24H Urine      100.0 - 300.0 mg/24 hr 262.2      Calcium, Urine      mg/dL 13.8      Sodium, 24H Ur      40 - 220 mmol/24hrs    296 (H)   Sodium, Urine      30 - 90 mmol/L    156 (H)   Uric Acid, 24H Ur      250 - 750 mg/24 hr  781 (H)     Uric Acid, Urine      mg/dL  41.1     Oxalate, Urine      Undefined mg/L 18      Oxalates, Urine 24hr      4 - 31 mg/24 hr 34 (H)      Citric Acid, Urine      Undefined mg/L 203      Citrate 24H UR      320 - 1240 mg/24 hr 386         will meet with dietician for low sodium, oxalate and purine diet         Refer to surgery for primary hyperthyroidism ( scan located left parathyroid adenoma)           Thyroid nodule      PROCEDURE: Thyroid ultrasound     DATE OF EXAM: 6/26/2017     HISTORY: Primary hyperparathyroidism     Thyroid ultrasound was performed with multiple longitudinal and  transverse images of both lobes obtained. No previous studies are  available for comparison.     There is homogeneous and symmetric appearance of the thyroid  lobes. There is a single small 4 mm hypoechoic nodule in the mid  lateral aspect of the left thyroid lobe. Remaining aspects of  both lobes appear satisfactory. The right lobe measures 4.7 x 1.7  x 1.6 cm . The left lobe measures 4.6 x 1.7 x 1.2 cm.  The  isthmus measures 8 mm in thickness.     IMPRESSION:  Single small 4 mm nodule in the mid to lower pole of  the left lobe. Otherwise, unremarkable thyroid ultrasound    Repeat thyroid ultrasound in one year           4. Return in about 8 weeks (around 9/6/2017) for Recheck.

## 2017-08-01 ENCOUNTER — TELEPHONE (OUTPATIENT)
Dept: ENDOCRINOLOGY | Facility: CLINIC | Age: 52
End: 2017-08-01

## 2017-08-01 DIAGNOSIS — M81.0 OSTEOPOROSIS: Primary | ICD-10-CM

## 2017-08-04 ENCOUNTER — APPOINTMENT (OUTPATIENT)
Dept: PREADMISSION TESTING | Facility: HOSPITAL | Age: 52
End: 2017-08-04

## 2017-08-04 ENCOUNTER — CONSULT (OUTPATIENT)
Dept: SURGERY | Facility: CLINIC | Age: 52
End: 2017-08-04

## 2017-08-04 VITALS
HEART RATE: 82 BPM | OXYGEN SATURATION: 98 % | SYSTOLIC BLOOD PRESSURE: 150 MMHG | HEIGHT: 68 IN | RESPIRATION RATE: 18 BRPM | DIASTOLIC BLOOD PRESSURE: 90 MMHG | BODY MASS INDEX: 33.34 KG/M2 | WEIGHT: 220 LBS

## 2017-08-04 VITALS
SYSTOLIC BLOOD PRESSURE: 120 MMHG | WEIGHT: 226 LBS | DIASTOLIC BLOOD PRESSURE: 80 MMHG | BODY MASS INDEX: 34.25 KG/M2 | HEIGHT: 68 IN

## 2017-08-04 DIAGNOSIS — I10 ESSENTIAL HYPERTENSION: ICD-10-CM

## 2017-08-04 DIAGNOSIS — E04.1 SOLITARY THYROID NODULE: ICD-10-CM

## 2017-08-04 DIAGNOSIS — N20.0 NEPHROLITHIASIS: ICD-10-CM

## 2017-08-04 DIAGNOSIS — E21.3 HYPERPARATHYROIDISM (HCC): Primary | ICD-10-CM

## 2017-08-04 DIAGNOSIS — E78.49 OTHER HYPERLIPIDEMIA: ICD-10-CM

## 2017-08-04 DIAGNOSIS — I25.810 CORONARY ARTERY DISEASE INVOLVING CORONARY BYPASS GRAFT OF NATIVE HEART WITHOUT ANGINA PECTORIS: ICD-10-CM

## 2017-08-04 DIAGNOSIS — J41.0 SIMPLE CHRONIC BRONCHITIS (HCC): ICD-10-CM

## 2017-08-04 LAB
ANION GAP SERPL CALCULATED.3IONS-SCNC: 13 MMOL/L (ref 5–15)
BUN BLD-MCNC: 12 MG/DL (ref 7–21)
BUN/CREAT SERPL: 13.6 (ref 7–25)
CALCIUM SPEC-SCNC: 11.2 MG/DL (ref 8.4–10.2)
CHLORIDE SERPL-SCNC: 104 MMOL/L (ref 95–110)
CO2 SERPL-SCNC: 24 MMOL/L (ref 22–31)
CREAT BLD-MCNC: 0.88 MG/DL (ref 0.5–1)
GFR SERPL CREATININE-BSD FRML MDRD: 67 ML/MIN/1.73 (ref 51–120)
GLUCOSE BLD-MCNC: 97 MG/DL (ref 60–100)
POTASSIUM BLD-SCNC: 3.9 MMOL/L (ref 3.5–5.1)
SODIUM BLD-SCNC: 141 MMOL/L (ref 137–145)

## 2017-08-04 PROCEDURE — 36415 COLL VENOUS BLD VENIPUNCTURE: CPT

## 2017-08-04 PROCEDURE — 80048 BASIC METABOLIC PNL TOTAL CA: CPT | Performed by: ANESTHESIOLOGY

## 2017-08-04 PROCEDURE — 99204 OFFICE O/P NEW MOD 45 MIN: CPT | Performed by: SURGERY

## 2017-08-04 RX ORDER — SODIUM CHLORIDE 9 MG/ML
100 INJECTION, SOLUTION INTRAVENOUS CONTINUOUS
Status: CANCELLED | OUTPATIENT
Start: 2017-08-14

## 2017-08-04 RX ORDER — ISOSORBIDE MONONITRATE 60 MG/1
60 TABLET, EXTENDED RELEASE ORAL DAILY
COMMUNITY
End: 2020-03-02 | Stop reason: SDUPTHER

## 2017-08-04 NOTE — DISCHARGE INSTRUCTIONS
[facility]  Pre-op Information and Guidelines    You will be called after 2 p.m. the day before your surgery (Friday for Monday surgery) and notified of your time for arrival and approximate surgery time.  If you have not received a call by 4P.M., please contact Same Day Surgery at [phone] of if outside [facility] call [phone].    Please Follow these Important Safety Guidelines:    • The morning of your procedure, take only the medications listed below with   A sip of water:_____________________________________________       ______________________________________________    • DO NOT eat or drink anything after 12:00 midnight the night before surgery  Specific instructions concerning drinking clear liquids will be discussed during  the pre-surgery instruction call the day before your surgery.    • If you take a blood thinner (ex. Plavix, Coumadin, aspirin), ask your doctor when to stop it before surgery  STOP DATE: _________________    • Only 2 visitors are allowed in patient rooms at a time  Your visitors will be asked to wait in the lobby until the admission process is complete with the exception of a parent with a child and patients in need of special assistance.    • YOU CANNOT DRIVE YOURSELF HOME  You must be accompanied by someone who will be responsible for driving you home after surgery and for your care at home.    • DO NOT chew gum, use breath mints, hard candy, or smoke the day of surgery  • DO NOT drink alcohol for at least 24 hours before your surgery  • DO NOT wear any jewelry and remove all body piercing before coming to the hospital  • DO NOT wear make-up to the hospital  • If you are having surgery on an extremity (arm/leg/foot) remove nail polish/artificial nails on the surgical side  • Clothing, glasses, contacts, dentures, and hairpieces must be removed before surgery  • Bathe the night before or the morning of your surgery and do not use powders/lotions on  skin.

## 2017-08-04 NOTE — PROGRESS NOTES
Chief Complaint   Patient presents with   •  Hyperparathyroidism                HPI  This woman is 52 years old and has a history of multiple kidney stones and chronic bone pain.  She does note a positive family history for hyperparathyroidism with both mother and sister having been diagnosed.  There is no history of pheochromocytoma or thyroid cancer.  Workup so far has included the following:          Ref Range & Units 1mo ago     PTH, Intact 10.0 - 65.0 pg/mL 263.5 (H)   Resulting [facility]           Basic Metabolic Panel (8/4/2017)    Status:  Final result   Visible to patient:  No (Not Released)         Ref Range & Units 8/4/2017    1mo ago   2yr ago      Glucose 60 - 100 mg/dL 97 97 109 (H)R    BUN 7 - 21 mg/dL 12 14 14R    Creatinine 0.50 - 1.00 mg/dL 0.88 0.90 0.8R    Sodium 137 - 145 mmol/L 141 141 138    Potassium 3.5 - 5.1 mmol/L 3.9 4.5 3.8    Chloride 95 - 110 mmol/L 104 101 106    CO2 22.0 - 31.0 mmol/L 24.0 26.0     Calcium 8.4 - 10.2 mg/dL 11.2 (H) 11.0 (H) 9.8R    eGFR Non African Amer 51 - 120 mL/min/1.73 67 66R     BUN/Creatinine Ratio 7.0 - 25.0 13.6 15.6     Anion Gap 5.0 - 15.0 mmol/L 13.0 14.0 6.0   Resulting [facility]                    Study Result   Nuclear medicine parathyroid scan with SPECT.     HISTORY: Primary hyperparathyroidism.     Following the intravenous infusion of 21.3 mCi of technetium 99  sestamibi images of the neck and upper chest are obtained. Both  planar and SPECT images are obtained in addition SPECT-CT fusion  images are also obtained. Images obtained both immediately and  following delay.     There is physiological uptake in the thyroid and salivary glands  on delayed images. On delayed images there is washout of activity  but persistent activity in the area adjacent to the lower pole  left lobe of thyroid. This is therefore suspicious for  parathyroid adenoma.     CT fusion images demonstrate evidence of prior coronary  artery  bypass surgery.     IMPRESSION:  CONCLUSION: Persistent focus of increased uptake towards the  lower pole left lobe of thyroid suspicious for parathyroid  adenoma.     Electronically signed by:  Lukas Wills MD  6/26/2017 3:09 PM CDT  Workstation: [id number]     Study Result      [facility] [place]     Patient Name: ELTON PHILLIPS     ATTENDING:     REFERRING: ENRIQUE LUGO     ORDERING: ENRIQUE LUGO     -----------------------     PROCEDURE: Thyroid ultrasound     DATE OF EXAM: 6/26/2017     HISTORY: Primary hyperparathyroidism     Thyroid ultrasound was performed with multiple longitudinal and  transverse images of both lobes obtained. No previous studies are  available for comparison.     There is homogeneous and symmetric appearance of the thyroid  lobes. There is a single small 4 mm hypoechoic nodule in the mid  lateral aspect of the left thyroid lobe. Remaining aspects of  both lobes appear satisfactory. The right lobe measures 4.7 x 1.7  x 1.6 cm . The left lobe measures 4.6 x 1.7 x 1.2 cm.  The  isthmus measures 8 mm in thickness.     IMPRESSION:  Single small 4 mm nodule in the mid to lower pole of  the left lobe. Otherwise, unremarkable thyroid ultrasound.        Electronically signed by:  Rangel Redman MD  6/26/2017 5:06 PM  CDT Workstation: [email or web address]         Past Medical History:   Diagnosis Date   • Chronic back pain     And leg, DJD      • Chronic depression 09/18/2014    With anxiety   • Chronic pain disorder    • Coronary arteriosclerosis 09/18/2014    [place] NSTEMI      • Costal chondritis 07/02/2014   • Dyslipidemia    • Essential hypertension 06/17/2014   • GERD (gastroesophageal reflux disease)      03/13/2015   • Low back pain    • Obese 07/02/2014   • Pain of sternum 09/18/2014   • Pseudoaneurysm of femoral artery 03/13/2015    Post-catheterization femoral pseudoaneurysm     • Surgical follow-up care 09/18/2014    CABG X 3 3/23/14      • Vitamin D  deficiency 2017       Past Surgical History:   Procedure Laterality Date   • BACK SURGERY      multiple   • CARDIAC CATHETERIZATION  2015    Cardiac cath 99224 (2)      •  SECTION      x 2   • CHOLECYSTECTOMY      Cholecystectomy (1)      • CORONARY ARTERY BYPASS GRAFT  2014    CABG (1)      • EXTRACORPOREAL SHOCK WAVE LITHOTRIPSY (ESWL)      multiple   • HYSTERECTOMY      Anesth, hysterectomy (1)      • INJECTION OF MEDICATION  2015    Percu Tx of Extrem Pseudoaneurysm US guided 70744 (1)      • TONSILLECTOMY           Current Outpatient Prescriptions:   •  ASPIRIN PO, Take 81 mg by mouth Daily., Disp: , Rfl:   •  atorvastatin (LIPITOR) 20 MG tablet, Take 1 tablet by mouth Daily. (Patient taking differently: Take 20 mg by mouth Every Night.), Disp: 30 tablet, Rfl: 6  •  Calcium Citrate 200 MG tablet, 400 mg daily (Patient taking differently: Take 2 tablets by mouth Daily. 400 mg daily), Disp: 60 each, Rfl: 5  •  carvedilol (COREG) 3.125 MG tablet, Take 3.125 mg by mouth 2 (Two) Times a Day., Disp: , Rfl:   •  clopidogrel (PLAVIX) 75 MG tablet, Take 1 tablet by mouth Daily., Disp: 30 tablet, Rfl: 4  •  cyclobenzaprine (FLEXERIL) 10 MG tablet, Take 10 mg by mouth 3 (Three) Times a Day As Needed., Disp: , Rfl:   •  Ergocalciferol (VITAMIN D2 PO), Take 1.25 mg by mouth Every 30 (Thirty) Days., Disp: , Rfl:   •  gabapentin (NEURONTIN) 300 MG capsule, Take 300 mg by mouth 2 (Two) Times a Day., Disp: , Rfl:   •  HYDROcodone-acetaminophen (NORCO) 7.5-325 MG per tablet, Take 1 tablet by mouth 4 (four) times a day as needed., Disp: , Rfl:   •  lisinopril (PRINIVIL,ZESTRIL) 40 MG tablet, Take 40 mg by mouth Daily., Disp: , Rfl:   •  omeprazole (PriLOSEC) 20 MG capsule, Take 20 mg by mouth Daily., Disp: , Rfl:   •  spironolactone (ALDACTONE) 25 MG tablet, Take 1 tablet by mouth Daily., Disp: 30 tablet, Rfl: 11  •  Tiotropium Bromide Monohydrate (SPIRIVA HANDIHALER IN), Inhale 1 puff Daily As  Needed., Disp: , Rfl:   •  traZODone (DESYREL) 50 MG tablet, Take 50 mg by mouth Every Night., Disp: , Rfl:   •  venlafaxine (EFFEXOR) 75 MG tablet, Take 75 mg by mouth 2 (Two) Times a Day., Disp: , Rfl:   •  VENTOLIN  (90 BASE) MCG/ACT inhaler, Inhale 2 puffs Every 4 (Four) Hours As Needed., Disp: , Rfl:   •  isosorbide mononitrate (IMDUR) 60 MG 24 hr tablet, Take 60 mg by mouth Daily., Disp: , Rfl:     No Known Allergies    Family History   Problem Relation Age of Onset   • Diabetes Mother    • Hyperparathyroidism Mother    • Mental illness Father    • COPD Sister    • Osteoporosis Sister    • Hyperparathyroidism Sister    • Cancer Maternal Aunt    • Diabetes Maternal Aunt    • Diabetes Maternal Uncle    • Coronary artery disease Neg Hx        Social History     Social History   • Marital status:      Spouse name: N/A   • Number of children: N/A   • Years of education: N/A     Occupational History   • Not on file.     Social History Main Topics   • Smoking status: Former Smoker     Packs/day: 0.50     Types: Cigarettes     Quit date: 2017   • Smokeless tobacco: Never Used   • Alcohol use No   • Drug use: No     Review of Systems   Constitutional: Negative for appetite change, chills, fever and unexpected weight change.   HENT: Negative for hearing loss, nosebleeds and trouble swallowing.    Eyes: Negative for visual disturbance.   Respiratory: Negative for apnea, cough, choking, chest tightness, shortness of breath, wheezing and stridor.    Cardiovascular: Positive for chest pain. Negative for palpitations and leg swelling.   Gastrointestinal: Negative for abdominal distention, abdominal pain, blood in stool, constipation, diarrhea, nausea and vomiting.        No dysphagia   Endocrine: Negative for cold intolerance, heat intolerance, polydipsia, polyphagia and polyuria.   Genitourinary: Negative for difficulty urinating, dysuria, frequency, hematuria and urgency.   Musculoskeletal: Positive for  arthralgias, back pain, myalgias and neck pain.   Skin: Negative for color change, pallor and rash.   Allergic/Immunologic: Negative for immunocompromised state.   Neurological: Negative for dizziness, seizures, syncope, light-headedness, numbness and headaches.   Hematological: Negative for adenopathy.   Psychiatric/Behavioral: Negative for suicidal ideas. The patient is not nervous/anxious.        Physical Exam   Constitutional: She is oriented to person, place, and time. She appears well-developed and well-nourished. No distress.   HENT:   Head: Normocephalic and atraumatic.   Eyes: EOM are normal. Pupils are equal, round, and reactive to light.   Neck: Normal range of motion. Neck supple. No JVD present. No tracheal deviation present. No thyromegaly present.   Cardiovascular: Normal rate and regular rhythm.    Pulmonary/Chest: Effort normal and breath sounds normal. No stridor. No respiratory distress.   Abdominal: Soft. Bowel sounds are normal.   Musculoskeletal: Normal range of motion. She exhibits no edema, tenderness or deformity.   Lymphadenopathy:     She has no cervical adenopathy.   Neurological: She is alert and oriented to person, place, and time.   Skin: Skin is warm and dry. She is not diaphoretic. No erythema. No pallor.   Psychiatric: She has a normal mood and affect. Her behavior is normal. Judgment and thought content normal.   Nursing note and vitals reviewed.        ASSESSMENT    Diagnoses and all orders for this visit:    Hyperparathyroidism  -     Case Request; Standing  -     sodium chloride 0.9 % infusion; Infuse 100 mL/hr into a venous catheter Continuous.  -     ceFAZolin (ANCEF) 2 g in sodium chloride 0.9 % 100 mL IVPB; Infuse 2 g into a venous catheter 1 (One) Time.  -     Case Request    Solitary thyroid nodule    Nephrolithiasis    Simple chronic bronchitis    Coronary artery disease involving coronary bypass graft of native heart without angina pectoris    Essential  hypertension    Other hyperlipidemia    Other orders  -     Follow Anesthesia Guidelines / Standing Orders; Future  -     Follow Anesthesia Guidelines / Standing Orders; Standing  -     Provide instructions to patient on NPO status  -     Obtain Informed Consent; Standing  -     GENO Hose - Place on Patient in Pre-Op; Standing  -     SCD (Sequential Compression Device) - Place on Patient in Pre-Op; Standing  -     PTH, Intact & Calcium; Standing        PLAN    1.  Parathyroidectomy is planned    Parathyroidectomy is explained with the risks and benefits of surgery.      It is explained that there are four parathyroid glands in the neck located behind the thyroid gland. They are very small (each the size of a small pea) and they control how much calcium is in the body. Most of the time, the parathyroid glands work perfectly. Occasionally, one or more of the parathyroid glands can become overactive. This causes the body to have too much calcium in the bloodstream, a condition called primary hyperparathyroidism. This can be detected by a simple blood test to determine the level of your body’s calcium and parathyroid hormone (also referred to as PTH). While calcium is important to one's health, too much calcium can produce several unwanted symptoms. These can include muscle and bone pain, osteoporosis, fatigue, weakness, kidney stones, and depression.   Usually, the cause of primary hyperparathyroidism is a benign (non-cancerous) tumor in one of the parathyroid glands. This tumor is called a parathyroid adenoma. There are other less common causes of excessive calcium as well. Sometimes, all four glands become overactive, a condition called parathyroid hyperplasia. In most cases, primary hyperparathyroidism is treated with surgery to remove the abnormal parathyroid gland(s). Surgery is done through an incision in the neck. After removing the abnormal parathyroid gland(s), your calcium and PTH levels will return to normal  and symptoms will soon resolve. Patients who undergo parathyroid surgery are often discharged home from the hospital the same day. Narcotic or semi?synthetic narcotic pain medicine is usually prescribed to be taken on an as needed basis.   It is explained that erious complications are rare. The indications and risks of surgery, as well as expected outcomes, have been explained. Alternatives to the surgery is not to have the surgery performed, and continue with medical management of the problem.   Risks of surgery are as follows:    BLEEDING: Minor bleeding from the incision is typically not a problem; however, heavy bleeding deeper in the neck can be very serious and can potentially cause difficulty with breathing. If not addressed in a timely fashion it can possibly cause suffocation.    HYPO?PARATHYROIDISM OR LOW BLOOD CALCIUM: This is a problem that can occur if the entire thyroid gland was removed or if you have had surgery for hyperparathyroidism. You may or may not have been prescribed calcium and vitamin D after surgery. If you develop tingling of your lips, fingers or toes or if your muscles feel spastic or cramping, notify us immediately so this can be also managed in a timely fashion. If this occurs, it is usually temporary, but in rare instances, low blood calcium may be permanent.     HOARSENESS: Parathyroid surgery involves dissection around the nerves that control the vocal cords. The vocal cord muscles rotate out to an open position while taking a breath of air and are rotated in and squeezed together in the midline in order to create voice. Each vocal cord has two nerves that control its movement. The larger nerve travels up from below the vocal cords and is named the recurrent laryngeal nerve. It is responsible for the majority of the movement of the vocal cord. A smaller nerve travels down from above the voice box to control a muscle on the outside of the voice box (larynx). It is named the  superior laryngeal nerve, and it helps in fine-tuning the voice allowing for control of your pitch and your ability to project the voice. Surgery to the parathyroid glands places all these nerves at risk for injury. One-sided surgery places the upper and lower nerves on that side at risk for injury. Surgery to both sides places all 4 nerves at risk for injury. Permanent damage to these nerves is rare. Temporary injury to these nerves occurs on occasion. The symptoms of laryngeal nerve injury depend on which nerve(s) is affected and can be as minor as mild hoarseness or can be more serious as severe hoarseness. Extremely rarely, if both recurrent laryngeal nerves were to be injured, this could lead to difficulty in breathing in air, necessitating emergency airway control by tracheotomy    INFECTION: Infection after parathyroidectomy is very rare.     POSSIBLE NEED FOR FURTHER TREATMENT AND/OR SURGERY: Depending on the problem for which the surgery was performed, as well as the result of the specimens that were sent to the laboratory for pathologic study, sometimes further surgical therapy or further medical therapy is needed.     POOR SCARRING: While we strive and normally achieve excellent scar camouflage, occasionally there is a poor cosmetic result.. The scar is typically placed in a previous existing neck crease. Scars are typically raised, somewhat red and bumpy for three months, but over the next year or so, the scar softens, matures and becomes much less visible. Very rarely poor scarring can occur. In these rare instances, scar revision can be performed.     NON?RESOLUTION OF THE PROBLEM: Depending on the condition that necessitated the surgery, the outcomes may vary. With a single tumor, the cure rate approaches 100%. On occasion, a second tumor might become evident over time, and additional surgeries might be needed. With enlargement of all of the glands, there is up to a 15% chance that the first surgery  is not curative, and further surgeries might be necessary.     As with any type of surgery, the risks of anesthesia such as drug reaction, breathing difficulties and even death are possible.     The patient was offered the opportunity to ask questions.  She understands and agrees.          This document has been electronically signed by Jason England MD on August 5, 2017 1:01 PM

## 2017-08-04 NOTE — PAT
Chlorhexidine skin prep given with instruction for use, understanding verbalized.    Patient states Dr England gave her instruction on stopping plavix.

## 2017-08-14 ENCOUNTER — ANESTHESIA EVENT (OUTPATIENT)
Dept: PERIOP | Facility: HOSPITAL | Age: 52
End: 2017-08-14

## 2017-08-14 ENCOUNTER — ANESTHESIA (OUTPATIENT)
Dept: PERIOP | Facility: HOSPITAL | Age: 52
End: 2017-08-14

## 2017-08-14 ENCOUNTER — HOSPITAL ENCOUNTER (OUTPATIENT)
Facility: HOSPITAL | Age: 52
Setting detail: HOSPITAL OUTPATIENT SURGERY
Discharge: HOME | End: 2017-08-14
Attending: SURGERY | Admitting: SURGERY

## 2017-08-14 VITALS
OXYGEN SATURATION: 95 % | TEMPERATURE: 97.6 F | SYSTOLIC BLOOD PRESSURE: 168 MMHG | WEIGHT: 223.33 LBS | DIASTOLIC BLOOD PRESSURE: 78 MMHG | HEART RATE: 60 BPM | RESPIRATION RATE: 18 BRPM | BODY MASS INDEX: 33.85 KG/M2 | HEIGHT: 68 IN

## 2017-08-14 DIAGNOSIS — E21.3 HYPERPARATHYROIDISM (HCC): ICD-10-CM

## 2017-08-14 LAB
CALCIUM SPEC-SCNC: 10.3 MG/DL (ref 8.4–10.2)
CALCIUM SPEC-SCNC: 11.3 MG/DL (ref 8.4–10.2)
PHOSPHATE SERPL-MCNC: 2.6 MG/DL (ref 2.4–4.4)
PTH-INTACT SERPL-MCNC: 258.9 PG/ML (ref 10–65)
PTH-INTACT SERPL-MCNC: 283.4 PG/ML (ref 10–65)
PTH-INTACT SERPL-SCNC: 21.4 PG/ML (ref 10–65)
PTH-INTACT SERPL-SCNC: 65.9 PG/ML (ref 10–65)
WHOLE BLOOD HOLD SPECIMEN: NORMAL

## 2017-08-14 PROCEDURE — 88305 TISSUE EXAM BY PATHOLOGIST: CPT | Performed by: PATHOLOGY

## 2017-08-14 PROCEDURE — 84100 ASSAY OF PHOSPHORUS: CPT | Performed by: SURGERY

## 2017-08-14 PROCEDURE — 25010000002 HYDROMORPHONE PER 4 MG: Performed by: NURSE ANESTHETIST, CERTIFIED REGISTERED

## 2017-08-14 PROCEDURE — 25010000002 PHENYLEPHRINE PER 1 ML: Performed by: NURSE ANESTHETIST, CERTIFIED REGISTERED

## 2017-08-14 PROCEDURE — 25010000002 FENTANYL CITRATE (PF) 100 MCG/2ML SOLUTION: Performed by: NURSE ANESTHETIST, CERTIFIED REGISTERED

## 2017-08-14 PROCEDURE — 25010000002 MIDAZOLAM PER 1 MG: Performed by: NURSE ANESTHETIST, CERTIFIED REGISTERED

## 2017-08-14 PROCEDURE — 25010000002 DEXAMETHASONE PER 1 MG: Performed by: NURSE ANESTHETIST, CERTIFIED REGISTERED

## 2017-08-14 PROCEDURE — 25010000002 PROPOFOL 10 MG/ML EMULSION: Performed by: NURSE ANESTHETIST, CERTIFIED REGISTERED

## 2017-08-14 PROCEDURE — 88305 TISSUE EXAM BY PATHOLOGIST: CPT | Performed by: SURGERY

## 2017-08-14 PROCEDURE — 25010000002 MEPERIDINE 25 MG/0.5ML SOLUTION: Performed by: NURSE ANESTHETIST, CERTIFIED REGISTERED

## 2017-08-14 PROCEDURE — 83970 ASSAY OF PARATHORMONE: CPT | Performed by: SURGERY

## 2017-08-14 PROCEDURE — 88331 PATH CONSLTJ SURG 1 BLK 1SPC: CPT | Performed by: SURGERY

## 2017-08-14 PROCEDURE — 60500 EXPLORE PARATHYROID GLANDS: CPT | Performed by: SURGERY

## 2017-08-14 PROCEDURE — 82310 ASSAY OF CALCIUM: CPT | Performed by: SURGERY

## 2017-08-14 PROCEDURE — 88331 PATH CONSLTJ SURG 1 BLK 1SPC: CPT | Performed by: PATHOLOGY

## 2017-08-14 RX ORDER — ACETAMINOPHEN 325 MG/1
650 TABLET ORAL ONCE AS NEEDED
Status: DISCONTINUED | OUTPATIENT
Start: 2017-08-14 | End: 2017-08-14 | Stop reason: HOSPADM

## 2017-08-14 RX ORDER — SODIUM CHLORIDE 9 MG/ML
100 INJECTION, SOLUTION INTRAVENOUS CONTINUOUS
Status: DISCONTINUED | OUTPATIENT
Start: 2017-08-14 | End: 2017-08-14 | Stop reason: HOSPADM

## 2017-08-14 RX ORDER — ACETAMINOPHEN 650 MG/1
650 SUPPOSITORY RECTAL ONCE AS NEEDED
Status: DISCONTINUED | OUTPATIENT
Start: 2017-08-14 | End: 2017-08-14 | Stop reason: HOSPADM

## 2017-08-14 RX ORDER — ROCURONIUM BROMIDE 10 MG/ML
INJECTION, SOLUTION INTRAVENOUS AS NEEDED
Status: DISCONTINUED | OUTPATIENT
Start: 2017-08-14 | End: 2017-08-14 | Stop reason: SURG

## 2017-08-14 RX ORDER — SODIUM CHLORIDE, SODIUM GLUCONATE, SODIUM ACETATE, POTASSIUM CHLORIDE, AND MAGNESIUM CHLORIDE 526; 502; 368; 37; 30 MG/100ML; MG/100ML; MG/100ML; MG/100ML; MG/100ML
INJECTION, SOLUTION INTRAVENOUS CONTINUOUS PRN
Status: DISCONTINUED | OUTPATIENT
Start: 2017-08-14 | End: 2017-08-14 | Stop reason: SURG

## 2017-08-14 RX ORDER — LABETALOL HYDROCHLORIDE 5 MG/ML
5 INJECTION, SOLUTION INTRAVENOUS
Status: DISCONTINUED | OUTPATIENT
Start: 2017-08-14 | End: 2017-08-14 | Stop reason: HOSPADM

## 2017-08-14 RX ORDER — FLUMAZENIL 0.1 MG/ML
0.2 INJECTION INTRAVENOUS AS NEEDED
Status: DISCONTINUED | OUTPATIENT
Start: 2017-08-14 | End: 2017-08-14 | Stop reason: HOSPADM

## 2017-08-14 RX ORDER — ONDANSETRON 2 MG/ML
4 INJECTION INTRAMUSCULAR; INTRAVENOUS ONCE AS NEEDED
Status: DISCONTINUED | OUTPATIENT
Start: 2017-08-14 | End: 2017-08-14 | Stop reason: HOSPADM

## 2017-08-14 RX ORDER — LIDOCAINE HYDROCHLORIDE 20 MG/ML
INJECTION, SOLUTION INFILTRATION; PERINEURAL AS NEEDED
Status: DISCONTINUED | OUTPATIENT
Start: 2017-08-14 | End: 2017-08-14 | Stop reason: SURG

## 2017-08-14 RX ORDER — EPHEDRINE SULFATE 50 MG/ML
5 INJECTION, SOLUTION INTRAVENOUS ONCE AS NEEDED
Status: DISCONTINUED | OUTPATIENT
Start: 2017-08-14 | End: 2017-08-14 | Stop reason: HOSPADM

## 2017-08-14 RX ORDER — MIDAZOLAM HYDROCHLORIDE 1 MG/ML
INJECTION INTRAMUSCULAR; INTRAVENOUS AS NEEDED
Status: DISCONTINUED | OUTPATIENT
Start: 2017-08-14 | End: 2017-08-14 | Stop reason: SURG

## 2017-08-14 RX ORDER — HYDROCODONE BITARTRATE AND ACETAMINOPHEN 7.5; 325 MG/1; MG/1
1 TABLET ORAL ONCE
Status: COMPLETED | OUTPATIENT
Start: 2017-08-14 | End: 2017-08-14

## 2017-08-14 RX ORDER — NALOXONE HCL 0.4 MG/ML
0.2 VIAL (ML) INJECTION AS NEEDED
Status: DISCONTINUED | OUTPATIENT
Start: 2017-08-14 | End: 2017-08-14 | Stop reason: HOSPADM

## 2017-08-14 RX ORDER — HYDROCODONE BITARTRATE AND ACETAMINOPHEN 7.5; 325 MG/1; MG/1
1 TABLET ORAL EVERY 6 HOURS PRN
Qty: 30 TABLET | Refills: 0 | Status: SHIPPED | OUTPATIENT
Start: 2017-08-14 | End: 2017-08-23 | Stop reason: SDUPTHER

## 2017-08-14 RX ORDER — DIPHENHYDRAMINE HYDROCHLORIDE 50 MG/ML
12.5 INJECTION INTRAMUSCULAR; INTRAVENOUS
Status: DISCONTINUED | OUTPATIENT
Start: 2017-08-14 | End: 2017-08-14 | Stop reason: HOSPADM

## 2017-08-14 RX ORDER — PROPOFOL 10 MG/ML
VIAL (ML) INTRAVENOUS AS NEEDED
Status: DISCONTINUED | OUTPATIENT
Start: 2017-08-14 | End: 2017-08-14 | Stop reason: SURG

## 2017-08-14 RX ORDER — DEXAMETHASONE SODIUM PHOSPHATE 4 MG/ML
INJECTION, SOLUTION INTRA-ARTICULAR; INTRALESIONAL; INTRAMUSCULAR; INTRAVENOUS; SOFT TISSUE AS NEEDED
Status: DISCONTINUED | OUTPATIENT
Start: 2017-08-14 | End: 2017-08-14 | Stop reason: SURG

## 2017-08-14 RX ORDER — FENTANYL CITRATE 50 UG/ML
INJECTION, SOLUTION INTRAMUSCULAR; INTRAVENOUS AS NEEDED
Status: DISCONTINUED | OUTPATIENT
Start: 2017-08-14 | End: 2017-08-14 | Stop reason: SURG

## 2017-08-14 RX ADMIN — MEPERIDINE HYDROCHLORIDE 12.5 MG: 50 INJECTION, SOLUTION INTRAMUSCULAR; INTRAVENOUS; SUBCUTANEOUS at 16:12

## 2017-08-14 RX ADMIN — MIDAZOLAM 2 MG: 1 INJECTION INTRAMUSCULAR; INTRAVENOUS at 13:14

## 2017-08-14 RX ADMIN — HYDROMORPHONE HYDROCHLORIDE 0.5 MG: 1 INJECTION, SOLUTION INTRAMUSCULAR; INTRAVENOUS; SUBCUTANEOUS at 16:20

## 2017-08-14 RX ADMIN — HYDROCODONE BITARTRATE AND ACETAMINOPHEN 1 TABLET: 7.5; 325 TABLET ORAL at 17:53

## 2017-08-14 RX ADMIN — SODIUM CHLORIDE, SODIUM GLUCONATE, SODIUM ACETATE, POTASSIUM CHLORIDE, AND MAGNESIUM CHLORIDE: 526; 502; 368; 37; 30 INJECTION, SOLUTION INTRAVENOUS at 15:24

## 2017-08-14 RX ADMIN — PROPOFOL 200 MG: 10 INJECTION, EMULSION INTRAVENOUS at 13:22

## 2017-08-14 RX ADMIN — DEXAMETHASONE SODIUM PHOSPHATE 4 MG: 4 INJECTION, SOLUTION INTRAMUSCULAR; INTRAVENOUS at 15:28

## 2017-08-14 RX ADMIN — PHENYLEPHRINE HYDROCHLORIDE 100 MCG: 10 INJECTION INTRAVENOUS at 15:22

## 2017-08-14 RX ADMIN — HYDROMORPHONE HYDROCHLORIDE 0.5 MG: 1 INJECTION, SOLUTION INTRAMUSCULAR; INTRAVENOUS; SUBCUTANEOUS at 16:35

## 2017-08-14 RX ADMIN — FENTANYL CITRATE 50 MCG: 50 INJECTION, SOLUTION INTRAMUSCULAR; INTRAVENOUS at 15:00

## 2017-08-14 RX ADMIN — ROCURONIUM BROMIDE 50 MG: 10 INJECTION INTRAVENOUS at 13:22

## 2017-08-14 RX ADMIN — FENTANYL CITRATE 50 MCG: 50 INJECTION, SOLUTION INTRAMUSCULAR; INTRAVENOUS at 13:56

## 2017-08-14 RX ADMIN — FENTANYL CITRATE 50 MCG: 50 INJECTION, SOLUTION INTRAMUSCULAR; INTRAVENOUS at 13:52

## 2017-08-14 RX ADMIN — MEPERIDINE HYDROCHLORIDE 12.5 MG: 50 INJECTION, SOLUTION INTRAMUSCULAR; INTRAVENOUS; SUBCUTANEOUS at 16:00

## 2017-08-14 RX ADMIN — ROCURONIUM BROMIDE 20 MG: 10 INJECTION INTRAVENOUS at 13:49

## 2017-08-14 RX ADMIN — SODIUM CHLORIDE 100 ML/HR: 9 INJECTION, SOLUTION INTRAVENOUS at 11:25

## 2017-08-14 RX ADMIN — FENTANYL CITRATE 50 MCG: 50 INJECTION, SOLUTION INTRAMUSCULAR; INTRAVENOUS at 13:20

## 2017-08-14 RX ADMIN — LIDOCAINE HYDROCHLORIDE 100 MG: 20 INJECTION, SOLUTION INFILTRATION; PERINEURAL at 13:22

## 2017-08-14 RX ADMIN — MEPERIDINE HYDROCHLORIDE 12.5 MG: 50 INJECTION, SOLUTION INTRAMUSCULAR; INTRAVENOUS; SUBCUTANEOUS at 16:07

## 2017-08-14 RX ADMIN — MEPERIDINE HYDROCHLORIDE 12.5 MG: 50 INJECTION, SOLUTION INTRAMUSCULAR; INTRAVENOUS; SUBCUTANEOUS at 15:55

## 2017-08-14 NOTE — ANESTHESIA PREPROCEDURE EVALUATION
Anesthesia Evaluation     no history of anesthetic complications:  NPO Solid Status: > 8 hours  NPO Liquid Status: > 8 hours     Airway   Mallampati: I  TM distance: >3 FB  Neck ROM: full  no difficulty expected  Dental    (+) upper dentures        Pulmonary - negative pulmonary ROS and normal exam    breath sounds clear to auscultation  Cardiovascular - normal exam  Exercise tolerance: poor (<4 METS)    ECG reviewed  Rhythm: regular  Rate: normal    (+) hypertension, past MI  >12 months, CAD, CABG > 6 Months, cardiac stents more than 12 months ago hyperlipidemia  (-) angina, murmur    ROS comment: Clearance by Dr colon per patient    3/2017 EKG  NSR    3/15 ECHO  1. Moderately dilated left atrium.  2. Mild concentric left ventricular hypertrophy with early diastolic        dysfunction.  3. No regional wall motion abnormality. The estimated ejection fraction        approximately 50% to 60%.  4. Normal right ventricle size and function.  5. On color flow Doppler, there is mild mitral, pulmonic and tricuspid        regurgitation with right ventricle systolic pressure approximately        50-55 mmHg.  6. No intracardiac mass, pericardial effusion, cardiac thrombus seen    Neuro/Psych  (+) psychiatric history Depression,    GI/Hepatic/Renal/Endo    (+) obesity,  GERD well controlled, renal disease (hx of stones) stones,     Musculoskeletal     (+) back pain,   Abdominal    Substance History - negative use     OB/GYN negative ob/gyn ROS         Other   (+) arthritis                                   Anesthesia Plan    ASA 3     general     intravenous induction   Anesthetic plan and risks discussed with patient.

## 2017-08-14 NOTE — H&P (VIEW-ONLY)
Chief Complaint   Patient presents with   •  Hyperparathyroidism                HPI  This woman is 52 years old and has a history of multiple kidney stones and chronic bone pain.  She does note a positive family history for hyperparathyroidism with both mother and sister having been diagnosed.  There is no history of pheochromocytoma or thyroid cancer.  Workup so far has included the following:          Ref Range & Units 1mo ago     PTH, Intact 10.0 - 65.0 pg/mL 263.5 (H)   Resulting [facility]           Basic Metabolic Panel (8/4/2017)    Status:  Final result   Visible to patient:  No (Not Released)         Ref Range & Units 8/4/2017    1mo ago   2yr ago      Glucose 60 - 100 mg/dL 97 97 109 (H)R    BUN 7 - 21 mg/dL 12 14 14R    Creatinine 0.50 - 1.00 mg/dL 0.88 0.90 0.8R    Sodium 137 - 145 mmol/L 141 141 138    Potassium 3.5 - 5.1 mmol/L 3.9 4.5 3.8    Chloride 95 - 110 mmol/L 104 101 106    CO2 22.0 - 31.0 mmol/L 24.0 26.0     Calcium 8.4 - 10.2 mg/dL 11.2 (H) 11.0 (H) 9.8R    eGFR Non African Amer 51 - 120 mL/min/1.73 67 66R     BUN/Creatinine Ratio 7.0 - 25.0 13.6 15.6     Anion Gap 5.0 - 15.0 mmol/L 13.0 14.0 6.0   Resulting [facility]                    Study Result   Nuclear medicine parathyroid scan with SPECT.     HISTORY: Primary hyperparathyroidism.     Following the intravenous infusion of 21.3 mCi of technetium 99  sestamibi images of the neck and upper chest are obtained. Both  planar and SPECT images are obtained in addition SPECT-CT fusion  images are also obtained. Images obtained both immediately and  following delay.     There is physiological uptake in the thyroid and salivary glands  on delayed images. On delayed images there is washout of activity  but persistent activity in the area adjacent to the lower pole  left lobe of thyroid. This is therefore suspicious for  parathyroid adenoma.     CT fusion images demonstrate evidence of prior coronary  artery  bypass surgery.     IMPRESSION:  CONCLUSION: Persistent focus of increased uptake towards the  lower pole left lobe of thyroid suspicious for parathyroid  adenoma.     Electronically signed by:  Lukas Wills MD  6/26/2017 3:09 PM CDT  Workstation: [id number]     Study Result      [facility] [place]     Patient Name: ELTON PHILLIPS     ATTENDING:     REFERRING: ENRIQUE ULGO     ORDERING: ENRIQUE LUGO     -----------------------     PROCEDURE: Thyroid ultrasound     DATE OF EXAM: 6/26/2017     HISTORY: Primary hyperparathyroidism     Thyroid ultrasound was performed with multiple longitudinal and  transverse images of both lobes obtained. No previous studies are  available for comparison.     There is homogeneous and symmetric appearance of the thyroid  lobes. There is a single small 4 mm hypoechoic nodule in the mid  lateral aspect of the left thyroid lobe. Remaining aspects of  both lobes appear satisfactory. The right lobe measures 4.7 x 1.7  x 1.6 cm . The left lobe measures 4.6 x 1.7 x 1.2 cm.  The  isthmus measures 8 mm in thickness.     IMPRESSION:  Single small 4 mm nodule in the mid to lower pole of  the left lobe. Otherwise, unremarkable thyroid ultrasound.        Electronically signed by:  Rangel Redman MD  6/26/2017 5:06 PM  CDT Workstation: [id number]         Past Medical History:   Diagnosis Date   • Chronic back pain     And leg, DJD      • Chronic depression 09/18/2014    With anxiety   • Chronic pain disorder    • Coronary arteriosclerosis 09/18/2014    [place] NSTEMI      • Costal chondritis 07/02/2014   • Dyslipidemia    • Essential hypertension 06/17/2014   • GERD (gastroesophageal reflux disease)      03/13/2015   • Low back pain    • Obese 07/02/2014   • Pain of sternum 09/18/2014   • Pseudoaneurysm of femoral artery 03/13/2015    Post-catheterization femoral pseudoaneurysm     • Surgical follow-up care 09/18/2014    CABG X 3 3/23/14      • Vitamin D  deficiency 2017       Past Surgical History:   Procedure Laterality Date   • BACK SURGERY      multiple   • CARDIAC CATHETERIZATION  2015    Cardiac cath 28169 (2)      •  SECTION      x 2   • CHOLECYSTECTOMY      Cholecystectomy (1)      • CORONARY ARTERY BYPASS GRAFT  2014    CABG (1)      • EXTRACORPOREAL SHOCK WAVE LITHOTRIPSY (ESWL)      multiple   • HYSTERECTOMY      Anesth, hysterectomy (1)      • INJECTION OF MEDICATION  2015    Percu Tx of Extrem Pseudoaneurysm US guided 24353 (1)      • TONSILLECTOMY           Current Outpatient Prescriptions:   •  ASPIRIN PO, Take 81 mg by mouth Daily., Disp: , Rfl:   •  atorvastatin (LIPITOR) 20 MG tablet, Take 1 tablet by mouth Daily. (Patient taking differently: Take 20 mg by mouth Every Night.), Disp: 30 tablet, Rfl: 6  •  Calcium Citrate 200 MG tablet, 400 mg daily (Patient taking differently: Take 2 tablets by mouth Daily. 400 mg daily), Disp: 60 each, Rfl: 5  •  carvedilol (COREG) 3.125 MG tablet, Take 3.125 mg by mouth 2 (Two) Times a Day., Disp: , Rfl:   •  clopidogrel (PLAVIX) 75 MG tablet, Take 1 tablet by mouth Daily., Disp: 30 tablet, Rfl: 4  •  cyclobenzaprine (FLEXERIL) 10 MG tablet, Take 10 mg by mouth 3 (Three) Times a Day As Needed., Disp: , Rfl:   •  Ergocalciferol (VITAMIN D2 PO), Take 1.25 mg by mouth Every 30 (Thirty) Days., Disp: , Rfl:   •  gabapentin (NEURONTIN) 300 MG capsule, Take 300 mg by mouth 2 (Two) Times a Day., Disp: , Rfl:   •  HYDROcodone-acetaminophen (NORCO) 7.5-325 MG per tablet, Take 1 tablet by mouth 4 (four) times a day as needed., Disp: , Rfl:   •  lisinopril (PRINIVIL,ZESTRIL) 40 MG tablet, Take 40 mg by mouth Daily., Disp: , Rfl:   •  omeprazole (PriLOSEC) 20 MG capsule, Take 20 mg by mouth Daily., Disp: , Rfl:   •  spironolactone (ALDACTONE) 25 MG tablet, Take 1 tablet by mouth Daily., Disp: 30 tablet, Rfl: 11  •  Tiotropium Bromide Monohydrate (SPIRIVA HANDIHALER IN), Inhale 1 puff Daily As  Needed., Disp: , Rfl:   •  traZODone (DESYREL) 50 MG tablet, Take 50 mg by mouth Every Night., Disp: , Rfl:   •  venlafaxine (EFFEXOR) 75 MG tablet, Take 75 mg by mouth 2 (Two) Times a Day., Disp: , Rfl:   •  VENTOLIN  (90 BASE) MCG/ACT inhaler, Inhale 2 puffs Every 4 (Four) Hours As Needed., Disp: , Rfl:   •  isosorbide mononitrate (IMDUR) 60 MG 24 hr tablet, Take 60 mg by mouth Daily., Disp: , Rfl:     No Known Allergies    Family History   Problem Relation Age of Onset   • Diabetes Mother    • Hyperparathyroidism Mother    • Mental illness Father    • COPD Sister    • Osteoporosis Sister    • Hyperparathyroidism Sister    • Cancer Maternal Aunt    • Diabetes Maternal Aunt    • Diabetes Maternal Uncle    • Coronary artery disease Neg Hx        Social History     Social History   • Marital status:      Spouse name: N/A   • Number of children: N/A   • Years of education: N/A     Occupational History   • Not on file.     Social History Main Topics   • Smoking status: Former Smoker     Packs/day: 0.50     Types: Cigarettes     Quit date: 2017   • Smokeless tobacco: Never Used   • Alcohol use No   • Drug use: No     Review of Systems   Constitutional: Negative for appetite change, chills, fever and unexpected weight change.   HENT: Negative for hearing loss, nosebleeds and trouble swallowing.    Eyes: Negative for visual disturbance.   Respiratory: Negative for apnea, cough, choking, chest tightness, shortness of breath, wheezing and stridor.    Cardiovascular: Positive for chest pain. Negative for palpitations and leg swelling.   Gastrointestinal: Negative for abdominal distention, abdominal pain, blood in stool, constipation, diarrhea, nausea and vomiting.        No dysphagia   Endocrine: Negative for cold intolerance, heat intolerance, polydipsia, polyphagia and polyuria.   Genitourinary: Negative for difficulty urinating, dysuria, frequency, hematuria and urgency.   Musculoskeletal: Positive for  arthralgias, back pain, myalgias and neck pain.   Skin: Negative for color change, pallor and rash.   Allergic/Immunologic: Negative for immunocompromised state.   Neurological: Negative for dizziness, seizures, syncope, light-headedness, numbness and headaches.   Hematological: Negative for adenopathy.   Psychiatric/Behavioral: Negative for suicidal ideas. The patient is not nervous/anxious.        Physical Exam   Constitutional: She is oriented to person, place, and time. She appears well-developed and well-nourished. No distress.   HENT:   Head: Normocephalic and atraumatic.   Eyes: EOM are normal. Pupils are equal, round, and reactive to light.   Neck: Normal range of motion. Neck supple. No JVD present. No tracheal deviation present. No thyromegaly present.   Cardiovascular: Normal rate and regular rhythm.    Pulmonary/Chest: Effort normal and breath sounds normal. No stridor. No respiratory distress.   Abdominal: Soft. Bowel sounds are normal.   Musculoskeletal: Normal range of motion. She exhibits no edema, tenderness or deformity.   Lymphadenopathy:     She has no cervical adenopathy.   Neurological: She is alert and oriented to person, place, and time.   Skin: Skin is warm and dry. She is not diaphoretic. No erythema. No pallor.   Psychiatric: She has a normal mood and affect. Her behavior is normal. Judgment and thought content normal.   Nursing note and vitals reviewed.        ASSESSMENT    Diagnoses and all orders for this visit:    Hyperparathyroidism  -     Case Request; Standing  -     sodium chloride 0.9 % infusion; Infuse 100 mL/hr into a venous catheter Continuous.  -     ceFAZolin (ANCEF) 2 g in sodium chloride 0.9 % 100 mL IVPB; Infuse 2 g into a venous catheter 1 (One) Time.  -     Case Request    Solitary thyroid nodule    Nephrolithiasis    Simple chronic bronchitis    Coronary artery disease involving coronary bypass graft of native heart without angina pectoris    Essential  hypertension    Other hyperlipidemia    Other orders  -     Follow Anesthesia Guidelines / Standing Orders; Future  -     Follow Anesthesia Guidelines / Standing Orders; Standing  -     Provide instructions to patient on NPO status  -     Obtain Informed Consent; Standing  -     GENO Hose - Place on Patient in Pre-Op; Standing  -     SCD (Sequential Compression Device) - Place on Patient in Pre-Op; Standing  -     PTH, Intact & Calcium; Standing        PLAN    1.  Parathyroidectomy is planned    Parathyroidectomy is explained with the risks and benefits of surgery.      It is explained that there are four parathyroid glands in the neck located behind the thyroid gland. They are very small (each the size of a small pea) and they control how much calcium is in the body. Most of the time, the parathyroid glands work perfectly. Occasionally, one or more of the parathyroid glands can become overactive. This causes the body to have too much calcium in the bloodstream, a condition called primary hyperparathyroidism. This can be detected by a simple blood test to determine the level of your body’s calcium and parathyroid hormone (also referred to as PTH). While calcium is important to one's health, too much calcium can produce several unwanted symptoms. These can include muscle and bone pain, osteoporosis, fatigue, weakness, kidney stones, and depression.   Usually, the cause of primary hyperparathyroidism is a benign (non-cancerous) tumor in one of the parathyroid glands. This tumor is called a parathyroid adenoma. There are other less common causes of excessive calcium as well. Sometimes, all four glands become overactive, a condition called parathyroid hyperplasia. In most cases, primary hyperparathyroidism is treated with surgery to remove the abnormal parathyroid gland(s). Surgery is done through an incision in the neck. After removing the abnormal parathyroid gland(s), your calcium and PTH levels will return to normal  and symptoms will soon resolve. Patients who undergo parathyroid surgery are often discharged home from the hospital the same day. Narcotic or semi?synthetic narcotic pain medicine is usually prescribed to be taken on an as needed basis.   It is explained that erious complications are rare. The indications and risks of surgery, as well as expected outcomes, have been explained. Alternatives to the surgery is not to have the surgery performed, and continue with medical management of the problem.   Risks of surgery are as follows:    BLEEDING: Minor bleeding from the incision is typically not a problem; however, heavy bleeding deeper in the neck can be very serious and can potentially cause difficulty with breathing. If not addressed in a timely fashion it can possibly cause suffocation.    HYPO?PARATHYROIDISM OR LOW BLOOD CALCIUM: This is a problem that can occur if the entire thyroid gland was removed or if you have had surgery for hyperparathyroidism. You may or may not have been prescribed calcium and vitamin D after surgery. If you develop tingling of your lips, fingers or toes or if your muscles feel spastic or cramping, notify us immediately so this can be also managed in a timely fashion. If this occurs, it is usually temporary, but in rare instances, low blood calcium may be permanent.     HOARSENESS: Parathyroid surgery involves dissection around the nerves that control the vocal cords. The vocal cord muscles rotate out to an open position while taking a breath of air and are rotated in and squeezed together in the midline in order to create voice. Each vocal cord has two nerves that control its movement. The larger nerve travels up from below the vocal cords and is named the recurrent laryngeal nerve. It is responsible for the majority of the movement of the vocal cord. A smaller nerve travels down from above the voice box to control a muscle on the outside of the voice box (larynx). It is named the  superior laryngeal nerve, and it helps in fine-tuning the voice allowing for control of your pitch and your ability to project the voice. Surgery to the parathyroid glands places all these nerves at risk for injury. One-sided surgery places the upper and lower nerves on that side at risk for injury. Surgery to both sides places all 4 nerves at risk for injury. Permanent damage to these nerves is rare. Temporary injury to these nerves occurs on occasion. The symptoms of laryngeal nerve injury depend on which nerve(s) is affected and can be as minor as mild hoarseness or can be more serious as severe hoarseness. Extremely rarely, if both recurrent laryngeal nerves were to be injured, this could lead to difficulty in breathing in air, necessitating emergency airway control by tracheotomy    INFECTION: Infection after parathyroidectomy is very rare.     POSSIBLE NEED FOR FURTHER TREATMENT AND/OR SURGERY: Depending on the problem for which the surgery was performed, as well as the result of the specimens that were sent to the laboratory for pathologic study, sometimes further surgical therapy or further medical therapy is needed.     POOR SCARRING: While we strive and normally achieve excellent scar camouflage, occasionally there is a poor cosmetic result.. The scar is typically placed in a previous existing neck crease. Scars are typically raised, somewhat red and bumpy for three months, but over the next year or so, the scar softens, matures and becomes much less visible. Very rarely poor scarring can occur. In these rare instances, scar revision can be performed.     NON?RESOLUTION OF THE PROBLEM: Depending on the condition that necessitated the surgery, the outcomes may vary. With a single tumor, the cure rate approaches 100%. On occasion, a second tumor might become evident over time, and additional surgeries might be needed. With enlargement of all of the glands, there is up to a 15% chance that the first surgery  is not curative, and further surgeries might be necessary.     As with any type of surgery, the risks of anesthesia such as drug reaction, breathing difficulties and even death are possible.     The patient was offered the opportunity to ask questions.  She understands and agrees.          This document has been electronically signed by Jason England MD on August 5, 2017 1:01 PM

## 2017-08-14 NOTE — ANESTHESIA POSTPROCEDURE EVALUATION
Patient: Sena Romero    Procedure Summary     Date Anesthesia Start Anesthesia Stop Room / Location    08/14/17 1314 1546 [facility] OR 03 / [facility]       Procedure Diagnosis Surgeon Provider    PARATHYROIDECTOMY (Left Neck) Hyperparathyroidism  (Hyperparathyroidism [E21.3]) MD Jovanni Jack MD          Anesthesia Type: general  Last vitals  BP        Temp        Pulse       Resp        SpO2          Post Anesthesia Care and Evaluation    Patient location during evaluation: PACU  Level of consciousness: awake and alert  Pain score: 4  Pain management: adequate  Airway patency: patent  Anesthetic complications: No anesthetic complications  PONV Status: none  Cardiovascular status: acceptable  Respiratory status: acceptable  Hydration status: acceptable

## 2017-08-14 NOTE — OP NOTE
PARATHYROIDECTOMY  Procedure Note    Sena Romero  8/14/2017    Pre-op Diagnosis:   Hyperparathyroidism [E21.3]    Post-op Diagnosis:     Post-Op Diagnosis Codes:     * Hyperparathyroidism [E21.3]    Procedure(s):  Excision of left lower pole parathyroid adenoma    Surgeon(s):  Jason England MD    Anesthesia: General    Staff:   Circulator: Maude Baumann; Rick Carmona RN; Patrice Rivera RN  Scrub Person: Ledy Mitchell; Shell Rodriguez  Assistant: Georgia Cortez CSA    Estimated Blood Loss: 2 cc    Specimens:                  ID Type Source Tests Collected by Time Destination   1 : PTH Blood Blood, Venous Line PTH INTRAOPERATIVE Jason England MD 8/14/2017 1433    2 :  Blood Blood, Venous Line PTH INTRAOPERATIVE Non Lab Personnel 8/14/2017 1501    A : left lower pole. Is this parathyroid adenoma? Tissue Parathyroid Gland TISSUE EXAM Jason England MD 8/14/2017 1417    B :  Tissue Parathyroid Gland TISSUE EXAM Jason England MD 8/14/2017 1434          Drains:    None        Findings: Parathyroid adenoma attached to the left lower pole of the thyroid gland was parathormone levels decreasing  from 283.4 to 21.4 after adenoma excision.    Complications: None    Indications: This patient is a 52 -year-old woman with chemical evidence of hyperparathyroidism.  Sestamibi scanning had demonstrated the presence of an abnormal parathyroid gland in the left lower portion of the neck near the lower lobe of the thyroid.  This had not been confirmed by ultrasound.  She was taken to the operating room today for parathyroidectomy.    Description of procedure: The patient was brought to the operating room and placed supine on the operating table.  After adequate general endotracheal anesthesia, the neck was prepped and draped in a sterile manner.  Briefing and timeout were performed and all parties were in agreement.    A transverse low collar skin incision was made and carried through the platysma.  Subplatysmal flaps were created  superiorly and inferiorly.  Strap muscles were  and the anterior surface of the thyroid came into view.  The left thyroid was rotated out of its bed by dissecting away the loose areolar tissue between the thyroid and the underside of the straps.  The left recurrent nerve was clearly visualized.  The middle thyroid vessel was protected.    Initially, we noted some large tissue within the thymus gland inferior to the thyroid gland.  This was removed but on frozen section proved to be thyroid tissue.    On the left lateral, inferior surface of the thyroid gland, large parathyroid gland was noted.  A normal size gland was located along the upper surface of the thyroid gland.  The abnormal parathyroid gland was carefully dissected free of its surrounding structures, isolating the blood vessels to that parathyroid.  The vessels were clamped at their base and tied with 3-0 silk.  Parathyroid was then removed from the operative field.  Frozen section of the parathyroid gland demonstrated probable adenoma.    Parathormone levels were drawn pre-postoperatively, in 15 minutes following removal of the abnormal.  They demonstrated an appropriate drop.    Hemostasis was assured patient was given several Valsalva breaths.  No bleeding was noted.  Straps were re-approximated with interrupted 3-0 Vicryl suture; the platysma was re-approximated with interrupted 3-0 Vicryl suture.  In was brought together with continuous 4-0 subcuticular Vicryl suture.    The procedure was terminated.  She tolerated it well sponge and needle counts were correct.  She was returned to recovery in satisfactory condition.                This document has been electronically signed by Jason England MD on August 14, 2017 3:43 PM      Date: 8/14/2017  Time: 3:43 PM

## 2017-08-14 NOTE — ANESTHESIA PROCEDURE NOTES
Airway  Urgency: elective    Airway not difficult    General Information and Staff    Patient location during procedure: OR  CRNA: CAT DIAS    Indications and Patient Condition  Indications for airway management: airway protection    Preoxygenated: yes  MILS not maintained throughout  Mask difficulty assessment: 2 - vent by mask + OA or adjuvant +/- NMBA    Final Airway Details  Final airway type: endotracheal airway      Successful airway: ETT and reinforced tube  Cuffed: yes   Successful intubation technique: direct laryngoscopy  Facilitating devices/methods: intubating stylet  Endotracheal tube insertion site: oral  Blade: Robby  Blade size: #3  ETT size: 7.0 mm  Cormack-Lehane Classification: grade IIa - partial view of glottis  Placement verified by: chest auscultation and capnometry   Measured from: lips  ETT to lips (cm): 21  Number of attempts at approach: 1

## 2017-08-14 NOTE — DISCHARGE INSTRUCTIONS
PARATHYROID ADENOMA EXCISION  POST OP INSTRUCTIONS  Dr. Jason Enlgand  [address] [place] [id number]  Phone: [phone] (office)  [phone] (hospital)  [phone] (cell)    ACTIVITIES:  1. Keep your head elevated at 30 degrees or more as much as possible for the first 24 hours after surgery.  This will help reduce your postoperative swelling and thus decrease your pain.  2. Expect to rest most of the first two days after surgery, but do get up several times daily to reduce the risk of getting a clot in your legs.  3. No strenuous activity or lifting over 10 lbs. for one week.  Add 5 lbs. a week to that restriction until 6 weeks out from surgery.  Try to avoid squatting and deep bends for about a week.  4. Do not drive while on pain medicine.  You must be off pain meds 24 hours before driving.  5. You can climb stairs, but minimize this and initially do one step at a time (both feet on one step rather than going up with each step.)  SYMPTOMS:  1. Some minor discomfort with swallowing can be expected.  Report any extensive difficulty to the office immediately.  Most symptoms resolve within a few days.   2. You may have numbness around the mouth or cramping after surgery. This may be a sign of a low calcium level or a rapid drop in calcium level.  3. Some neck swelling is expected and will resolve over the next few weeks.  Rapid neck swelling should prompt a call to the office or 911.  4. Constipation is common when taking pain medication for surgery.  Over the counter laxatives, such as Miralax, can be used temporarily.   5. Fatigue and decreased stamina is not unusual for about a week or so after surgery due to anesthesia.  Try to take walks and some mild activity between resting.  WOUND SITE:  1. Dressings for this surgery are minimal and consist of a steristrip over the incision with sutures internally.  This will begin to peel off after the first couple of weeks.  2. Showering may occur while  the “film” is in place the day after surgery.         3.   May use ice bag on the incision for the first 24 hours.  MEALS:  1. A soft diet is recommended the first 1-2 days after surgery and this can be expanded to a regular diet as tolerated.  2. Do NOT take pain pills on an empty stomach.  WORK:  1. In general if you have a sedentary job, you can return to work in 7-10 days.  If lifting or exertion is required it may be 2-3 weeks.    2. Use discretion and remember that your stamina will be decreased post operatively.  3. Return to work notes can be provided at the time of your post-operative appointment.  FOLLOW UP:  1. If no appointment is given, please call and make a post-operative appointment for approximately 7-10 days after the procedure.      MEDICATIONS:  1. You will have follow-up on your calcium levels and be weaned off of your doses or increased based on these results.   2. He may take Tums up to 4 times a day if numbness or tingling occurs  3. Do not resume Plavix until August 17, 2017

## 2017-08-14 NOTE — PLAN OF CARE
Problem: Patient Care Overview (Adult)  Goal: Plan of Care Review  Outcome: Ongoing (interventions implemented as appropriate)    08/14/17 1642   Coping/Psychosocial Response Interventions   Plan Of Care Reviewed With patient   Patient Care Overview   Progress improving   Outcome Evaluation   Outcome Summary/Follow up Plan vss. pain improving but throat still sore - ice chips help. will dc to [facility] once lab results are back.        Goal: Adult Individualization and Mutuality  Outcome: Ongoing (interventions implemented as appropriate)    Problem: Perioperative Period (Adult)  Goal: Signs and Symptoms of Listed Potential Problems Will be Absent or Manageable (Perioperative Period)  Outcome: Ongoing (interventions implemented as appropriate)

## 2017-08-15 LAB
LAB AP CASE REPORT: NORMAL
Lab: NORMAL
Lab: NORMAL
PATH REPORT.FINAL DX SPEC: NORMAL
PATH REPORT.GROSS SPEC: NORMAL

## 2017-08-23 ENCOUNTER — OFFICE VISIT (OUTPATIENT)
Dept: SURGERY | Facility: CLINIC | Age: 52
End: 2017-08-23

## 2017-08-23 VITALS
BODY MASS INDEX: 33.8 KG/M2 | SYSTOLIC BLOOD PRESSURE: 160 MMHG | DIASTOLIC BLOOD PRESSURE: 100 MMHG | WEIGHT: 223 LBS | HEIGHT: 68 IN

## 2017-08-23 DIAGNOSIS — E89.2 S/P PARATHYROIDECTOMY (HCC): Primary | ICD-10-CM

## 2017-08-23 PROCEDURE — 99024 POSTOP FOLLOW-UP VISIT: CPT | Performed by: SURGERY

## 2017-08-23 RX ORDER — HYDROCODONE BITARTRATE AND ACETAMINOPHEN 10; 325 MG/1; MG/1
TABLET ORAL
COMMUNITY
Start: 2017-08-15 | End: 2019-01-16

## 2017-08-29 NOTE — PROGRESS NOTES
Chief Complaint   Patient presents with   • Post-op Follow-up     Post operative parathyroidectomy 8-14-17        HPI  Doing well- no complaints.  Good voice strength. No tingling.    Final Diagnosis   Specimen 1 tissue, left lower pole:     Thyroid tissue  Specimen 2 tissue, left inferior pole:     Parathyroid adenoma   Electronically signed by Vijay Padilla MD on 8/15/2017 at 1535         Current Outpatient Prescriptions:   •  ASPIRIN PO, Take 81 mg by mouth Daily., Disp: , Rfl:   •  atorvastatin (LIPITOR) 20 MG tablet, Take 1 tablet by mouth Daily. (Patient taking differently: Take 20 mg by mouth Every Night.), Disp: 30 tablet, Rfl: 6  •  Calcium Citrate 200 MG tablet, 400 mg daily (Patient taking differently: Take 2 tablets by mouth Daily. 400 mg daily), Disp: 60 each, Rfl: 5  •  carvedilol (COREG) 3.125 MG tablet, Take 3.125 mg by mouth 2 (Two) Times a Day., Disp: , Rfl:   •  clopidogrel (PLAVIX) 75 MG tablet, Take 1 tablet by mouth Daily., Disp: 30 tablet, Rfl: 4  •  cyclobenzaprine (FLEXERIL) 10 MG tablet, Take 10 mg by mouth 3 (Three) Times a Day As Needed., Disp: , Rfl:   •  Ergocalciferol (VITAMIN D2 PO), Take 1.25 mg by mouth Every 30 (Thirty) Days., Disp: , Rfl:   •  gabapentin (NEURONTIN) 300 MG capsule, Take 300 mg by mouth 2 (Two) Times a Day., Disp: , Rfl:   •  HYDROcodone-acetaminophen (NORCO)  MG per tablet, , Disp: , Rfl:   •  isosorbide mononitrate (IMDUR) 60 MG 24 hr tablet, Take 60 mg by mouth Daily., Disp: , Rfl:   •  lisinopril (PRINIVIL,ZESTRIL) 40 MG tablet, Take 40 mg by mouth Daily., Disp: , Rfl:   •  omeprazole (PriLOSEC) 20 MG capsule, Take 20 mg by mouth Daily., Disp: , Rfl:   •  spironolactone (ALDACTONE) 25 MG tablet, Take 1 tablet by mouth Daily., Disp: 30 tablet, Rfl: 11  •  Tiotropium Bromide Monohydrate (SPIRIVA HANDIHALER IN), Inhale 1 puff Daily As Needed., Disp: , Rfl:   •  traZODone (DESYREL) 50 MG tablet, Take 50 mg by mouth Every Night., Disp: , Rfl:   •   venlafaxine (EFFEXOR) 75 MG tablet, Take 75 mg by mouth 2 (Two) Times a Day., Disp: , Rfl:   •  VENTOLIN  (90 BASE) MCG/ACT inhaler, Inhale 2 puffs Every 4 (Four) Hours As Needed., Disp: , Rfl:     No Known Allergies      Review of Systems  Nothing to add  Physical Exam   Neck: Normal range of motion. Neck supple. No JVD present. No tracheal deviation present. No thyromegaly present.       Pulmonary/Chest: No stridor.   Lymphadenopathy:     She has no cervical adenopathy.         ASSESSMENT    Sena was seen today for post-op follow-up.    Diagnoses and all orders for this visit:    S/P parathyroidectomy      PLAN    1. Recheck  As needed          This document has been electronically signed by Jason England MD on August 28, 2017 8:39 PM

## 2017-10-09 RX ORDER — CLOPIDOGREL BISULFATE 75 MG/1
TABLET ORAL
Qty: 30 TABLET | Refills: 6 | Status: SHIPPED | OUTPATIENT
Start: 2017-10-09 | End: 2018-05-18 | Stop reason: SDUPTHER

## 2017-10-09 RX ORDER — ATORVASTATIN CALCIUM 20 MG/1
TABLET, FILM COATED ORAL
Qty: 30 TABLET | Refills: 6 | Status: SHIPPED | OUTPATIENT
Start: 2017-10-09 | End: 2018-05-18 | Stop reason: SDUPTHER

## 2018-02-14 ENCOUNTER — OFFICE VISIT (OUTPATIENT)
Dept: CARDIOLOGY | Facility: CLINIC | Age: 53
End: 2018-02-14

## 2018-02-14 VITALS
BODY MASS INDEX: 36.98 KG/M2 | WEIGHT: 244 LBS | DIASTOLIC BLOOD PRESSURE: 92 MMHG | SYSTOLIC BLOOD PRESSURE: 168 MMHG | HEIGHT: 68 IN | HEART RATE: 83 BPM

## 2018-02-14 DIAGNOSIS — E78.49 OTHER HYPERLIPIDEMIA: ICD-10-CM

## 2018-02-14 DIAGNOSIS — I10 ESSENTIAL HYPERTENSION: ICD-10-CM

## 2018-02-14 DIAGNOSIS — I25.810 CORONARY ARTERY DISEASE INVOLVING CORONARY BYPASS GRAFT OF NATIVE HEART WITHOUT ANGINA PECTORIS: Primary | ICD-10-CM

## 2018-02-14 PROCEDURE — 99214 OFFICE O/P EST MOD 30 MIN: CPT | Performed by: INTERNAL MEDICINE

## 2018-02-14 RX ORDER — AMLODIPINE BESYLATE 5 MG/1
5 TABLET ORAL
Qty: 90 TABLET | Refills: 3 | Status: SHIPPED | OUTPATIENT
Start: 2018-02-14 | End: 2018-09-06 | Stop reason: SDUPTHER

## 2018-02-14 RX ORDER — CARVEDILOL 12.5 MG/1
12.5 TABLET ORAL 2 TIMES DAILY
Qty: 180 TABLET | Refills: 3 | Status: SHIPPED | OUTPATIENT
Start: 2018-02-14 | End: 2018-09-06 | Stop reason: SDUPTHER

## 2018-02-14 NOTE — PROGRESS NOTES
[facility] Cardiology  OFFICE NOTE    Sena Romero  53 y.o. female    2018  1. Coronary artery disease involving coronary bypass graft of native heart without angina pectoris    2. Essential hypertension    3. 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  She was a former smoker and quit smoking about 2 years ago.  She recently had thyroid surgery and follows with Dr. Mehdi Anderson.  She is on dual antiplatelet therapy.  Her blood pressure is not very well controlled and she gained about 22 pounds from the time I saw her last time.  I've increased the dose of Coreg and added amlodipine for better control blood pressure.  I talked about diet and exercise and not to gain more weight.  She denies any GI symptoms of CNS symptoms.              No Known Allergies      Past Medical History:   Diagnosis Date   • Arthritis    • Chronic back pain     And leg, DJD      • Chronic depression 2014    With anxiety   • Chronic pain disorder    • COPD (chronic obstructive pulmonary disease)    • Coronary arteriosclerosis 2014    3 vessel CABG in  then 1 stent in .  is followed by dr moncada   • Costal chondritis 2014   • Dyslipidemia    • Essential hypertension 2014   • GERD (gastroesophageal reflux disease)    • Hyperparathyroidism    • Low back pain    • Myocardial infarct    • Obese 2014   • Pain of sternum 2014   • Primary hyperparathyroidism 2017   • Pseudoaneurysm of femoral artery 2015    Post-catheterization femoral pseudoaneurysm     • Vitamin D deficiency 2017         Past Surgical History:   Procedure Laterality Date   • BACK SURGERY      multiple   • CARDIAC CATHETERIZATION  2015    Cardiac cath 41309 (2)      •  SECTION      x 2   •  CHOLECYSTECTOMY      Cholecystectomy (1)      • CORONARY ARTERY BYPASS GRAFT  03/23/2014    CABG (1)      • EXTRACORPOREAL SHOCK WAVE LITHOTRIPSY (ESWL)      multiple   • HYSTERECTOMY      Anesth, hysterectomy (1)      • INJECTION OF MEDICATION  03/03/2015    Percu Tx of Extrem Pseudoaneurysm US guided 60357 (1)      • GA EXPLORE PARATHYROID GLANDS Left 8/14/2017    Procedure: PARATHYROIDECTOMY;  Surgeon: Jason England MD;  Location: [facility];  Service: General   • TONSILLECTOMY           Family History   Problem Relation Age of Onset   • Diabetes Mother    • Hyperparathyroidism Mother    • Mental illness Father    • COPD Sister    • Osteoporosis Sister    • Hyperparathyroidism Sister    • Cancer Maternal Aunt    • Diabetes Maternal Aunt    • Diabetes Maternal Uncle    • Coronary artery disease Neg Hx          Social History     Social History   • Marital status:      Spouse name: N/A   • Number of children: N/A   • Years of education: N/A     Occupational History   • Not on file.     Social History Main Topics   • Smoking status: Former Smoker     Packs/day: 0.50     Types: Cigarettes     Quit date: 2017   • Smokeless tobacco: Never Used   • Alcohol use No   • Drug use: No   • Sexual activity: Defer     Other Topics Concern   • Not on file     Social History Narrative         Current Outpatient Prescriptions   Medication Sig Dispense Refill   • ASPIRIN PO Take 81 mg by mouth Daily.     • atorvastatin (LIPITOR) 20 MG tablet TAKE ONE TABLET BY MOUTH DAILY 30 tablet 6   • Calcium Citrate 200 MG tablet 400 mg daily (Patient taking differently: Take 2 tablets by mouth Daily. 400 mg daily) 60 each 5   • clopidogrel (PLAVIX) 75 MG tablet TAKE ONE TABLET BY MOUTH DAILY 30 tablet 6   • cyclobenzaprine (FLEXERIL) 10 MG tablet Take 10 mg by mouth 3 (Three) Times a Day As Needed.     • Ergocalciferol (VITAMIN D2 PO) Take 1.25 mg by mouth Every 30 (Thirty) Days.     • gabapentin (NEURONTIN) 300 MG capsule Take 300 mg by  "mouth 2 (Two) Times a Day.     • HYDROcodone-acetaminophen (NORCO)  MG per tablet      • isosorbide mononitrate (IMDUR) 60 MG 24 hr tablet Take 60 mg by mouth Daily.     • lisinopril (PRINIVIL,ZESTRIL) 40 MG tablet Take 40 mg by mouth Daily.     • omeprazole (PriLOSEC) 20 MG capsule Take 20 mg by mouth Daily.     • spironolactone (ALDACTONE) 25 MG tablet Take 1 tablet by mouth Daily. 30 tablet 11   • Tiotropium Bromide Monohydrate (SPIRIVA HANDIHALER IN) Inhale 1 puff Daily As Needed.     • traZODone (DESYREL) 50 MG tablet Take 50 mg by mouth Every Night.     • venlafaxine (EFFEXOR) 75 MG tablet Take 75 mg by mouth 2 (Two) Times a Day.     • VENTOLIN  (90 BASE) MCG/ACT inhaler Inhale 2 puffs Every 4 (Four) Hours As Needed.     • amLODIPine (NORVASC) 5 MG tablet Take 1 tablet by mouth Daily With Dinner. 90 tablet 3   • carvedilol (COREG) 12.5 MG tablet Take 1 tablet by mouth 2 (Two) Times a Day. 180 tablet 3     No current facility-administered medications for this visit.          Review of Systems     Constitution: Denies any fatigue, fever or chills    HENT: Denies any headache, hearing impairment,     Eyes: Denies any blurring of vision, or photophobia     Cardivascular - As per history of present illness     Respiratory system-shortness of breath NYHA class IIb   sleep apnea.     Endocrine:  history of hyperlipidemia  , Hypothyrodism       Musculoskeletal:  History of arthritis of the spine    Gastrointestinal: No nausea, vomiting, or melena    Genitourinary: No dysuria or hematuria    Neurological:   No history of seizure disorder, stroke, memory problems    Psychiatric/Behavioral:        No history of depression,      Hematological- no history of easy bruising             OBJECTIVE    /92  Pulse 83  Ht 172.7 cm (68\")  Wt 111 kg (244 lb)  BMI 37.1 kg/m2      Physical Exam     Constitutional: is oriented to person, place, and time.     Skin-warm and dry    Well developed and nourished in no " acute distress      Head: Normocephalic and atraumatic.     Eyes: Pupils are equal, round, and reactive to light.     Neck: Neck supple. No bruit in the carotids    Cardiovascular: [place] in the fifth intercostal space   Regular rate, and  Rhythm,    S1 greater than S2,     Pulmonary/Chest:   Air  Entry is equal on both sides  No wheezing or crackles,      Abdominal: Soft.  No hepatosplenomegaly, bowel sounds are present    Musculoskeletal: No kyphoscoliosis, no significant thickening of the joints    Neurological: is alert and oriented to person, place, and time.    cranial nerve are intact .   No motor or sensory deficit    Extremities-no edema, no radial femoral delay      Psychiatric: He has a normal mood and affect.                  His behavior is normal.           Procedures      Lab Results   Component Value Date    WBC 6.7 03/03/2015    HGB 12.7 03/03/2015    HCT 38.4 03/03/2015    MCV 90.0 03/03/2015     03/03/2015     Lab Results   Component Value Date    GLUCOSE 97 08/04/2017    BUN 12 08/04/2017    CREATININE 0.88 08/04/2017    EGFRIFNONA 67 08/04/2017    BCR 13.6 08/04/2017    CO2 24.0 08/04/2017    CALCIUM 10.3 (H) 08/14/2017    ALBUMIN 4.80 06/26/2017    AST 38 (H) 03/02/2015    ALT 36 03/02/2015     No results found for: CHOL  Lab Results   Component Value Date    TRIG 184 03/03/2015    TRIG 134 03/02/2015     Lab Results   Component Value Date    HDL 33 (L) 03/03/2015    HDL 42 (L) 03/02/2015     No components found for: LDLCALC  Lab Results   Component Value Date     03/03/2015     03/02/2015     No results found for: HDLLDLRATIO  No components found for: CHOLHDL  Lab Results   Component Value Date    HGBA1C 5.5 03/22/2014     Lab Results   Component Value Date    TSH 1.100 06/26/2017                  A/P    CAD status post CABG with three-vessel bypass on dual antiplatelet therapy with aspirin and Plavix, has nonunion of sternum with chronic sternal pain.    Hypertension not  very well controlled increased Coreg to 12.5 mg twice a day added amlodipine to lisinopril and Imdur.    Hyperlipidemia on atorvastatin.    Obesity has gained 22 pounds from the last time I saw him talked about diet and exercise.    Depression on trazodone and Effexor XR.    Follow-up in 6 months              This document has been electronically signed by Lj Oconnell MD on February 14, 2018 4:01 PM       EMR Dragon/Transcription disclaimer:   Some of this note may be an electronic transcription/translation of spoken language to printed text. The electronic translation of spoken language may permit erroneous, or at times, nonsensical words or phrases to be inadvertently transcribed; Although I have reviewed the note for such errors, some may still exist.

## 2018-05-18 RX ORDER — CLOPIDOGREL BISULFATE 75 MG/1
TABLET ORAL
Qty: 30 TABLET | Refills: 5 | Status: SHIPPED | OUTPATIENT
Start: 2018-05-18 | End: 2018-12-06 | Stop reason: SDUPTHER

## 2018-05-18 RX ORDER — ATORVASTATIN CALCIUM 20 MG/1
TABLET, FILM COATED ORAL
Qty: 30 TABLET | Refills: 5 | Status: SHIPPED | OUTPATIENT
Start: 2018-05-18 | End: 2018-12-06 | Stop reason: SDUPTHER

## 2018-06-15 RX ORDER — SPIRONOLACTONE 25 MG/1
TABLET ORAL
Qty: 30 TABLET | Refills: 10 | OUTPATIENT
Start: 2018-06-15

## 2018-07-27 RX ORDER — ISOSORBIDE MONONITRATE 60 MG/1
TABLET, EXTENDED RELEASE ORAL
Qty: 30 TABLET | Refills: 11 | Status: SHIPPED | OUTPATIENT
Start: 2018-07-27 | End: 2019-01-16 | Stop reason: SDUPTHER

## 2018-08-30 RX ORDER — SPIRONOLACTONE 25 MG/1
TABLET ORAL
Qty: 30 TABLET | Refills: 10 | OUTPATIENT
Start: 2018-08-30

## 2018-09-06 RX ORDER — CARVEDILOL 12.5 MG/1
12.5 TABLET ORAL 2 TIMES DAILY
Qty: 180 TABLET | Refills: 1 | Status: SHIPPED | OUTPATIENT
Start: 2018-09-06 | End: 2019-08-24 | Stop reason: SDUPTHER

## 2018-09-06 RX ORDER — AMLODIPINE BESYLATE 5 MG/1
5 TABLET ORAL
Qty: 90 TABLET | Refills: 1 | Status: SHIPPED | OUTPATIENT
Start: 2018-09-06 | End: 2019-01-16 | Stop reason: SDUPTHER

## 2018-12-06 RX ORDER — CLOPIDOGREL BISULFATE 75 MG/1
TABLET ORAL
Qty: 30 TABLET | Refills: 6 | Status: SHIPPED | OUTPATIENT
Start: 2018-12-06 | End: 2019-01-16 | Stop reason: SDUPTHER

## 2018-12-06 RX ORDER — ATORVASTATIN CALCIUM 20 MG/1
TABLET, FILM COATED ORAL
Qty: 30 TABLET | Refills: 6 | Status: SHIPPED | OUTPATIENT
Start: 2018-12-06 | End: 2019-01-16 | Stop reason: SDUPTHER

## 2019-01-16 ENCOUNTER — OFFICE VISIT (OUTPATIENT)
Dept: CARDIOLOGY | Facility: CLINIC | Age: 54
End: 2019-01-16

## 2019-01-16 VITALS
DIASTOLIC BLOOD PRESSURE: 90 MMHG | HEIGHT: 68 IN | SYSTOLIC BLOOD PRESSURE: 132 MMHG | OXYGEN SATURATION: 97 % | WEIGHT: 245 LBS | BODY MASS INDEX: 37.13 KG/M2 | HEART RATE: 70 BPM

## 2019-01-16 DIAGNOSIS — E78.00 PURE HYPERCHOLESTEROLEMIA: ICD-10-CM

## 2019-01-16 DIAGNOSIS — J41.0 SIMPLE CHRONIC BRONCHITIS (HCC): ICD-10-CM

## 2019-01-16 DIAGNOSIS — R06.02 SOB (SHORTNESS OF BREATH): ICD-10-CM

## 2019-01-16 DIAGNOSIS — I25.810 CORONARY ARTERY DISEASE INVOLVING CORONARY BYPASS GRAFT OF NATIVE HEART WITHOUT ANGINA PECTORIS: Primary | ICD-10-CM

## 2019-01-16 DIAGNOSIS — I10 ESSENTIAL HYPERTENSION: ICD-10-CM

## 2019-01-16 DIAGNOSIS — I20.9 ANGINA PECTORIS (HCC): ICD-10-CM

## 2019-01-16 PROCEDURE — 99214 OFFICE O/P EST MOD 30 MIN: CPT | Performed by: INTERNAL MEDICINE

## 2019-01-16 RX ORDER — ATORVASTATIN CALCIUM 20 MG/1
20 TABLET, FILM COATED ORAL DAILY
Qty: 30 TABLET | Refills: 12 | Status: SHIPPED | OUTPATIENT
Start: 2019-01-16 | End: 2020-03-02 | Stop reason: SDUPTHER

## 2019-01-16 RX ORDER — ISOSORBIDE MONONITRATE 60 MG/1
60 TABLET, EXTENDED RELEASE ORAL DAILY
Qty: 30 TABLET | Refills: 11 | Status: SHIPPED | OUTPATIENT
Start: 2019-01-16 | End: 2019-01-16

## 2019-01-16 RX ORDER — LISINOPRIL 10 MG/1
10 TABLET ORAL DAILY
Qty: 90 TABLET | Refills: 3 | Status: SHIPPED | OUTPATIENT
Start: 2019-01-16 | End: 2020-01-16 | Stop reason: SDUPTHER

## 2019-01-16 RX ORDER — AMLODIPINE BESYLATE 5 MG/1
5 TABLET ORAL
Qty: 90 TABLET | Refills: 6 | Status: SHIPPED | OUTPATIENT
Start: 2019-01-16 | End: 2020-02-06

## 2019-01-16 RX ORDER — CLOPIDOGREL BISULFATE 75 MG/1
75 TABLET ORAL DAILY
Qty: 30 TABLET | Refills: 12 | Status: SHIPPED | OUTPATIENT
Start: 2019-01-16 | End: 2020-03-02 | Stop reason: SDUPTHER

## 2019-01-16 RX ORDER — SPIRONOLACTONE AND HYDROCHLOROTHIAZIDE 25; 25 MG/1; MG/1
0.5 TABLET ORAL DAILY
Qty: 30 TABLET | Refills: 12 | Status: SHIPPED | OUTPATIENT
Start: 2019-01-16 | End: 2020-03-02 | Stop reason: SDUPTHER

## 2019-01-16 NOTE — PROGRESS NOTES
[facility] Cardiology  OFFICE NOTE    Sena Romero  53 y.o. female    2019  1. Coronary artery disease involving coronary bypass graft of native heart without angina pectoris    2. Essential hypertension    3. Pure hypercholesterolemia    4. Angina pectoris (CMS/HCC)    5. Simple chronic bronchitis (CMS/HCC)    6. 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  She was a former smoker and quit smoking about 2 years ago.  She recently had thyroid surgery and follows with Dr. Mehdi Anderson.  She is on dual antiplatelet therapy.  Her blood pressure is not very well controlled.  Her BMI is 37.2 I am Aldactazide half a tablet daily and continue the Coreg, Imdur and amlodipine along with lisinopril              No Known Allergies      Past Medical History:   Diagnosis Date   • Arthritis    • Chronic back pain     And leg, DJD      • Chronic depression 2014    With anxiety   • Chronic pain disorder    • COPD (chronic obstructive pulmonary disease) (CMS/HCC)    • Coronary arteriosclerosis 2014    3 vessel CABG in  then 1 stent in .  is followed by dr moncada   • Costal chondritis 2014   • Dyslipidemia    • Essential hypertension 2014   • GERD (gastroesophageal reflux disease)    • Hyperparathyroidism (CMS/HCC)    • Low back pain    • Myocardial infarct    • Obese 2014   • Pain of sternum 2014   • Primary hyperparathyroidism (CMS/HCC) 2017   • Pseudoaneurysm of femoral artery (CMS/HCC) 2015    Post-catheterization femoral pseudoaneurysm     • Vitamin D deficiency 2017         Past Surgical History:   Procedure Laterality Date   • BACK SURGERY      multiple   • CARDIAC CATHETERIZATION  2015    Cardiac cath 44324 (2)      •  SECTION      x  2   • CHOLECYSTECTOMY      Cholecystectomy (1)      • CORONARY ARTERY BYPASS GRAFT  2014    CABG (1)      • EXTRACORPOREAL SHOCK WAVE LITHOTRIPSY (ESWL)      multiple   • HYSTERECTOMY      Anesth, hysterectomy (1)      • INJECTION OF MEDICATION  2015    Percu Tx of Extrem Pseudoaneurysm US guided 93318 (1)      • MS EXPLORE PARATHYROID GLANDS Left 2017    Procedure: PARATHYROIDECTOMY;  Surgeon: Jason England MD;  Location: [facility];  Service: General   • TONSILLECTOMY           Family History   Problem Relation Age of Onset   • Diabetes Mother    • Hyperparathyroidism Mother    • Mental illness Father    • COPD Sister    • Osteoporosis Sister    • Hyperparathyroidism Sister    • Cancer Maternal Aunt    • Diabetes Maternal Aunt    • Diabetes Maternal Uncle    • Coronary artery disease Neg Hx          Social History     Socioeconomic History   • Marital status:      Spouse name: Not on file   • Number of children: Not on file   • Years of education: Not on file   • Highest education level: Not on file   Social Needs   • Financial resource strain: Not on file   • Food insecurity - worry: Not on file   • Food insecurity - inability: Not on file   • Transportation needs - medical: Not on file   • Transportation needs - non-medical: Not on file   Occupational History   • Not on file   Tobacco Use   • Smoking status: Former Smoker     Packs/day: 0.50     Types: Cigarettes     Last attempt to quit: 2017     Years since quittin.0   • Smokeless tobacco: Never Used   Substance and Sexual Activity   • Alcohol use: No   • Drug use: No   • Sexual activity: Defer   Other Topics Concern   • Not on file   Social History Narrative   • Not on file         Current Outpatient Medications   Medication Sig Dispense Refill   • amLODIPine (NORVASC) 5 MG tablet Take 1 tablet by mouth Daily With Dinner. 90 tablet 6   • ASPIRIN PO Take 81 mg by mouth Daily.     • atorvastatin (LIPITOR) 20 MG tablet Take 1 tablet by  "mouth Daily. 30 tablet 12   • carvedilol (COREG) 12.5 MG tablet Take 1 tablet by mouth 2 (Two) Times a Day. 180 tablet 1   • clopidogrel (PLAVIX) 75 MG tablet Take 1 tablet by mouth Daily. 30 tablet 12   • isosorbide mononitrate (IMDUR) 60 MG 24 hr tablet Take 60 mg by mouth Daily.     • Tiotropium Bromide Monohydrate (SPIRIVA HANDIHALER IN) Inhale 1 puff Daily As Needed.     • VENTOLIN  (90 BASE) MCG/ACT inhaler Inhale 2 puffs Every 4 (Four) Hours As Needed.     • lisinopril (PRINIVIL,ZESTRIL) 10 MG tablet Take 1 tablet by mouth Daily. 90 tablet 3   • spironolactone-hydrochlorothiazide (ALDACTAZIDE) 25-25 MG tablet Take 0.5 tablets by mouth Daily. 30 tablet 12     No current facility-administered medications for this visit.          Review of Systems     Constitution: Denies any fatigue, fever or chills    HENT: Denies any headache, hearing impairment,     Eyes: Denies any blurring of vision, or photophobia     Cardivascular - As per history of present illness     Respiratory system-shortness of breath NYHA class IIb   sleep apnea.     Endocrine:  history of hyperlipidemia  , Hypothyrodism       Musculoskeletal:  History of arthritis of the spine    Gastrointestinal: No nausea, vomiting, or melena    Genitourinary: No dysuria or hematuria    Neurological:   No history of seizure disorder, stroke, memory problems    Psychiatric/Behavioral:        No history of depression,      Hematological- no history of easy bruising             OBJECTIVE    /90   Pulse 70   Ht 172.8 cm (68.02\")   Wt 111 kg (245 lb)   SpO2 97%   BMI 37.23 kg/m²       Physical Exam     Constitutional: is oriented to person, place, and time.     Skin-warm and dry    Well developed and nourished in no acute distress      Head: Normocephalic and atraumatic.     Eyes: Pupils are equal, round, and reactive to light.     Neck: Neck supple. No bruit in the carotids    Cardiovascular: [place] in the fifth intercostal space   Regular rate, " and  Rhythm,    S1 greater than S2,     Pulmonary/Chest:   Air  Entry is equal on both sides  No wheezing or crackles,      Abdominal: Soft.  No hepatosplenomegaly, bowel sounds are present    Musculoskeletal: No kyphoscoliosis, no significant thickening of the joints    Neurological: is alert and oriented to person, place, and time.    cranial nerve are intact .   No motor or sensory deficit    Extremities-no edema, no radial femoral delay      Psychiatric: He has a normal mood and affect.                  His behavior is normal.           Procedures      Lab Results   Component Value Date    WBC 6.7 03/03/2015    HGB 12.7 03/03/2015    HCT 38.4 03/03/2015    MCV 90.0 03/03/2015     03/03/2015     Lab Results   Component Value Date    GLUCOSE 97 08/04/2017    BUN 12 08/04/2017    CREATININE 0.88 08/04/2017    EGFRIFNONA 67 08/04/2017    BCR 13.6 08/04/2017    CO2 24.0 08/04/2017    CALCIUM 10.3 (H) 08/14/2017    ALBUMIN 4.80 06/26/2017    AST 38 (H) 03/02/2015    ALT 36 03/02/2015     No results found for: CHOL  Lab Results   Component Value Date    TRIG 184 03/03/2015    TRIG 134 03/02/2015     Lab Results   Component Value Date    HDL 33 (L) 03/03/2015    HDL 42 (L) 03/02/2015     No components found for: LDLCALC  Lab Results   Component Value Date     03/03/2015     03/02/2015     No results found for: HDLLDLRATIO  No components found for: CHOLHDL  Lab Results   Component Value Date    HGBA1C 5.5 03/22/2014     Lab Results   Component Value Date    TSH 1.100 06/26/2017                  A/P    CAD status post CABG with three-vessel bypass on dual antiplatelet therapy with aspirin and Plavix, has nonunion of sternum with chronic sternal pain.  Will get an echo to assess LV function and valvular function    Hypertension -continue Coreg, amlodipine, lisinopril and added Aldactazide    Hyperlipidemia on atorvastatin.    Obesity- BMI is high talk to her. about diet and exercise-    Depression on  trazodone and Effexor XR.    Follow-up in 1 year              This document has been electronically signed by Lj Oconnell MD on January 16, 2019 3:12 PM       EMR Dragon/Transcription disclaimer:   Some of this note may be an electronic transcription/translation of spoken language to printed text. The electronic translation of spoken language may permit erroneous, or at times, nonsensical words or phrases to be inadvertently transcribed; Although I have reviewed the note for such errors, some may still exist.

## 2019-02-04 LAB
BH CV ECHO MEAS - ACS: 1.5 CM
BH CV ECHO MEAS - AO MAX PG (FULL): 6.1 MMHG
BH CV ECHO MEAS - AO MAX PG: 12.4 MMHG
BH CV ECHO MEAS - AO MEAN PG (FULL): 2 MMHG
BH CV ECHO MEAS - AO MEAN PG: 5 MMHG
BH CV ECHO MEAS - AO ROOT AREA (BSA CORRECTED): 1.3
BH CV ECHO MEAS - AO ROOT AREA: 7.1 CM^2
BH CV ECHO MEAS - AO ROOT DIAM: 3 CM
BH CV ECHO MEAS - AO V2 MAX: 176 CM/SEC
BH CV ECHO MEAS - AO V2 MEAN: 102 CM/SEC
BH CV ECHO MEAS - AO V2 VTI: 31.5 CM
BH CV ECHO MEAS - ASC AORTA: 2.8 CM
BH CV ECHO MEAS - AVA(I,A): 2.6 CM^2
BH CV ECHO MEAS - AVA(I,D): 2.6 CM^2
BH CV ECHO MEAS - AVA(V,A): 2.5 CM^2
BH CV ECHO MEAS - AVA(V,D): 2.5 CM^2
BH CV ECHO MEAS - BSA(HAYCOCK): 2.4 M^2
BH CV ECHO MEAS - BSA: 2.2 M^2
BH CV ECHO MEAS - BZI_BMI: 37.3 KILOGRAMS/M^2
BH CV ECHO MEAS - BZI_METRIC_HEIGHT: 172.7 CM
BH CV ECHO MEAS - BZI_METRIC_WEIGHT: 111.1 KG
BH CV ECHO MEAS - EDV(CUBED): 85.2 ML
BH CV ECHO MEAS - EDV(TEICH): 87.7 ML
BH CV ECHO MEAS - EF(CUBED): 85.7 %
BH CV ECHO MEAS - EF(TEICH): 79.3 %
BH CV ECHO MEAS - ESV(CUBED): 12.2 ML
BH CV ECHO MEAS - ESV(TEICH): 18.1 ML
BH CV ECHO MEAS - FS: 47.7 %
BH CV ECHO MEAS - IVS/LVPW: 1
BH CV ECHO MEAS - IVSD: 1.1 CM
BH CV ECHO MEAS - LA DIMENSION: 3.6 CM
BH CV ECHO MEAS - LA/AO: 1.2
BH CV ECHO MEAS - LV MASS(C)D: 168.9 GRAMS
BH CV ECHO MEAS - LV MASS(C)DI: 75.8 GRAMS/M^2
BH CV ECHO MEAS - LV MAX PG: 6.3 MMHG
BH CV ECHO MEAS - LV MEAN PG: 3 MMHG
BH CV ECHO MEAS - LV V1 MAX: 125 CM/SEC
BH CV ECHO MEAS - LV V1 MEAN: 79 CM/SEC
BH CV ECHO MEAS - LV V1 VTI: 23.6 CM
BH CV ECHO MEAS - LVIDD: 4.4 CM
BH CV ECHO MEAS - LVIDS: 2.3 CM
BH CV ECHO MEAS - LVOT AREA (M): 3.5 CM^2
BH CV ECHO MEAS - LVOT AREA: 3.5 CM^2
BH CV ECHO MEAS - LVOT DIAM: 2.1 CM
BH CV ECHO MEAS - LVPWD: 1.1 CM
BH CV ECHO MEAS - MV A MAX VEL: 70.6 CM/SEC
BH CV ECHO MEAS - MV DEC SLOPE: 576 CM/SEC^2
BH CV ECHO MEAS - MV E MAX VEL: 53.8 CM/SEC
BH CV ECHO MEAS - MV E/A: 0.76
BH CV ECHO MEAS - MV MAX PG: 2.7 MMHG
BH CV ECHO MEAS - MV MEAN PG: 1 MMHG
BH CV ECHO MEAS - MV P1/2T MAX VEL: 82.5 CM/SEC
BH CV ECHO MEAS - MV P1/2T: 42 MSEC
BH CV ECHO MEAS - MV V2 MAX: 81.6 CM/SEC
BH CV ECHO MEAS - MV V2 MEAN: 43.2 CM/SEC
BH CV ECHO MEAS - MV V2 VTI: 22 CM
BH CV ECHO MEAS - MVA P1/2T LCG: 2.7 CM^2
BH CV ECHO MEAS - MVA(P1/2T): 5.2 CM^2
BH CV ECHO MEAS - MVA(VTI): 3.7 CM^2
BH CV ECHO MEAS - PA MAX PG: 5.1 MMHG
BH CV ECHO MEAS - PA V2 MAX: 113 CM/SEC
BH CV ECHO MEAS - RAP SYSTOLE: 10 MMHG
BH CV ECHO MEAS - RVDD: 2.4 CM
BH CV ECHO MEAS - RVSP: 36.6 MMHG
BH CV ECHO MEAS - SI(AO): 99.9 ML/M^2
BH CV ECHO MEAS - SI(CUBED): 32.8 ML/M^2
BH CV ECHO MEAS - SI(LVOT): 36.7 ML/M^2
BH CV ECHO MEAS - SI(TEICH): 31.2 ML/M^2
BH CV ECHO MEAS - SV(AO): 222.7 ML
BH CV ECHO MEAS - SV(CUBED): 73 ML
BH CV ECHO MEAS - SV(LVOT): 81.7 ML
BH CV ECHO MEAS - SV(TEICH): 69.6 ML
BH CV ECHO MEAS - TR MAX VEL: 258 CM/SEC
LV EF 2D ECHO EST: 61 %
MAXIMAL PREDICTED HEART RATE: 166 BPM
STRESS TARGET HR: 141 BPM

## 2019-08-26 RX ORDER — CARVEDILOL 12.5 MG/1
TABLET ORAL
Qty: 180 TABLET | Refills: 0 | Status: ON HOLD | OUTPATIENT
Start: 2019-08-26 | End: 2020-02-26

## 2019-10-24 ENCOUNTER — TRANSCRIBE ORDERS (OUTPATIENT)
Dept: PHYSICAL THERAPY | Facility: CLINIC | Age: 54
End: 2019-10-24

## 2019-10-24 DIAGNOSIS — M79.631 PAIN OF RIGHT FOREARM: Primary | ICD-10-CM

## 2019-11-21 ENCOUNTER — TREATMENT (OUTPATIENT)
Dept: PHYSICAL THERAPY | Facility: CLINIC | Age: 54
End: 2019-11-21

## 2019-11-21 DIAGNOSIS — M79.631 PAIN OF RIGHT FOREARM: Primary | ICD-10-CM

## 2019-11-21 PROCEDURE — 97110 THERAPEUTIC EXERCISES: CPT | Performed by: PHYSICAL THERAPIST

## 2019-11-21 PROCEDURE — 97162 PT EVAL MOD COMPLEX 30 MIN: CPT | Performed by: PHYSICAL THERAPIST

## 2019-11-21 NOTE — PROGRESS NOTES
"  Physical Therapy Initial Evaluation and Plan of Care        Patient: Sena Romero   : 1965  Diagnosis/ICD-10 Code:  Pain of right forearm [M79.631]  Referring practitioner: Connor Galeano MD  Date of Initial Visit: 2019  Today's Date: 2019  Patient seen for 1 sessions    Next MD appt: 2019.  Recertification: 2019         Subjective Questionnaire: QuickDASH: 65.91%      Subjective Evaluation    History of Present Illness  Onset date: ~1 year.    Subjective comment: Patient eprots no injury that she can recall. She reports the R forearm started hurting someday and just locks up. She reports that her foirst 3 digits will draw up and the side of the arm will go down (last 2 digits). Usually last about 2-3 minutes and occurs about 2-3 times per day. SHe reprorts that the doctor wants to do an MRI but insurance requires PT first.  Patient Occupation: Disabled- due to Low back and MI Quality of life: fair    Pain  Current pain ratin  At best pain ratin  At worst pain rating: 10  Location: R forearm/hand  Quality: pulling and dull ache  Alleviating factors: \"notihng, just has to pass\"  Exacerbated by: \"Seems pedro\"    Social Support  Lives with: alone    Hand dominance: right    Diagnostic Tests  No diagnostic tests performed    Treatments  No previous or current treatments  Patient Goals  Patient goal: \"Stop it from doing what it is doing.\"           Objective       Static Posture     Head  Forward.    Shoulders  Elevated and rounded.    Observations     Additional Observation Details  No acute distress.    Tenderness     Additional Tenderness Details  No specific areas TTP.    Neurological Testing     Sensation     Wrist/Hand   Left   Intact: light touch and hot/cold discrimination    Right   Intact: light touch and hot/cold discrimination    Active Range of Motion     Left Wrist   Wrist flexion: 78 degrees   Wrist extension: 60 degrees   Radial deviation: 32 degrees "   Ulnar deviation: 32 degrees     Right Wrist   Wrist flexion: 74 degrees   Wrist extension: 64 degrees   Radial deviation: 14 degrees   Ulnar deviation: 34 degrees     Additional Active Range of Motion Details  Full PRO/Sup of forearm B. Full elbow flexion/extension.  Full AROM of B hands in all digits and B thumbs.    Strength/Myotome Testing     Left Wrist/Hand   Normal wrist strength  Wrist extension: 5  Wrist flexion: 5  Radial deviation: 5  Ulnar deviation: 5     (2nd hand position)     Trial 1: 16 lbs    Trial 2: 20 lbs    Trial 3: 15 lbs    Average: 17 lbs    Right Wrist/Hand   Wrist extension: 4+  Wrist flexion: 4+  Radial deviation: 4+  Ulnar deviation: 5     (2nd hand position)     Trial 1: 25 lbs    Trial 2: 22 lbs    Trial 3: 25 lbs    Average: 24 lbs    Tests     Left Elbow   Negative Cozen's and Tinel's sign (cubital tunnel).     Right Elbow   Negative Cozen's and Tinel's sign (cubital tunnel).     Left Wrist/Hand   Negative AIN OK sign, crossed finger, Finkelstein's, Froment's sign, Phalen's sign, Tinel's sign (medial nerve) and Tinel's sign (radial tunnel).     Right Wrist/Hand   Negative AIN OK sign, crossed finger, Finkelstein's, Froment's sign, Phalen's sign, Tinel's sign (medial nerve), Tinel's sign (radial tunnel) and UCL valgus stress.     Ambulation     Comments   FWB, non-antalgic gait, no distress, no assistive device, no significant gait deviation, normal arm swing with gait.         Assessment & Plan     Assessment  Impairments: abnormal or restricted ROM, activity intolerance, impaired physical strength, lacks appropriate home exercise program and pain with function  Prognosis: fair  Prognosis details: Barriers to Rehab: Include significant or possible arthritic/degenerative changes that have occurred within the joints, The chronicity of this issue    Safety Issues: None noted.    Functional Limitations: carrying objects, sleeping, uncomfortable because of pain and unable to  perform repetitive tasks  Goals  Plan Goals: Short Term Goals:  1) I with HEP and have additions/changes by next re-certification.    2) AROM B wrist extension>=70°.    3) AROM R wrist UD >= 40°.    4) AROM R wrist RD >= 25°.    5) R  at the #2 setting >= 30#.    Long Term goals:  1) AROM for the R wrist all WNL, no increase in pain.    2) B wrist 5/5, no increase in pain.    3) R  at the #2 setting >= 35#.    4) patient able to perform 10 minutes of fine motor activities with no increase in pain.    5) I with final HEP    6) D/C with a final HEP and free 30 day fitness formula memebership.      Plan  Therapy options: will be seen for skilled physical therapy services  Planned modality interventions: cryotherapy  Planned therapy interventions: body mechanics training, fine motor coordination training, flexibility, functional ROM exercises, home exercise program, manual therapy, soft tissue mobilization, strengthening, stretching and therapeutic activities  Duration in visits: 20  Treatment plan discussed with: patient  Plan details: Progress overall ROM, strength, , desensitization, activity tolerance, and s/s management.    Other therapeutic activities and/or exercises will be prescribed depending on the patients progress or lack there of.    Visit Diagnoses:    ICD-10-CM ICD-9-CM   1. Pain of right forearm M79.631 729.5       Timed:  Manual Therapy:         mins  23557;  Therapeutic Exercise:    15     mins  73568;     Neuromuscular Karolina:        mins  36021;    Therapeutic Activity:          mins  55691;     Gait Training:           mins  59382;     Ultrasound:          mins  03995;    Electrical Stimulation:         mins  58469 ( );    Untimed:  Electrical Stimulation:         mins  29863 ( );  Mechanical Traction:         mins  00498;     Timed Treatment:   15   mins   Total Treatment:     38   mins    PT SIGNATURE: Odalys Jc, PT DPT, CSCS   DATE TREATMENT INITIATED:  11/21/2019    Initial Certification  Certification Period: 2/19/2020  I certify that the therapy services are furnished while this patient is under my care.  The services outlined above are required by this patient, and will be reviewed every 90 days.     PHYSICIAN: Connor Galeano MD      DATE:     Please sign and return via fax to  .. Thank you, [facility].

## 2019-11-25 ENCOUNTER — TREATMENT (OUTPATIENT)
Dept: PHYSICAL THERAPY | Facility: CLINIC | Age: 54
End: 2019-11-25

## 2019-11-25 DIAGNOSIS — M79.631 PAIN OF RIGHT FOREARM: Primary | ICD-10-CM

## 2019-11-25 PROCEDURE — 97110 THERAPEUTIC EXERCISES: CPT | Performed by: PHYSICAL THERAPIST

## 2019-11-25 NOTE — PROGRESS NOTES
Physical Therapy Daily Progress Note      Patient: Sena Romero   : 1965  Referring practitioner: Connor Galeano MD  Date of Initial Visit: Type: THERAPY  Noted: 2019  Today's Date: 2019  Patient seen for 2 sessions    Next MD appt: 2019.  Recertification: 2019             Subjective     Objective   See Exercise, Manual, and Modality Logs for complete treatment.       Assessment & Plan     Assessment  Assessment details: No new goals met at this time. Pt tolerated pro ll fwd/back for ROM this date. PTA added ulna nerve glide this date to HEP. Pt agreed to ice after treatment.    Goals  Plan Goals: Plan Goals: Short Term Goals:  1) I with HEP and have additions/changes by next re-certification. (ongoing/progressing)    2) AROM B wrist extension>=70°. (ongoing/progressing)    3) AROM R wrist UD >= 40°. (ongoing/progressing)    4) AROM R wrist RD >= 25°. (ongoing/progressing)    5) R  at the #2 setting >= 30#. (ongoing/progress)    Long Term goals:  1) AROM for the R wrist all WNL, no increase in pain.    2) B wrist 5/5, no increase in pain.    3) R  at the #2 setting >= 35#.    4) patient able to perform 10 minutes of fine motor activities with no increase in pain.    5) I with final HEP    6) D/C with a final HEP and free 30 day fitness formula memebership.     Plan  Plan details: Add pray s or table stretch.        Visit Diagnoses:    ICD-10-CM ICD-9-CM   1. Pain of right forearm M79.631 729.5       Progress per Plan of Care and Progress strengthening /stabilization /functional activity           Timed:  Manual Therapy:         mins  55544;  Therapeutic Exercise:    40     mins  36718;     Neuromuscular Karolina:        mins  21123;    Therapeutic Activity:          mins  42983;     Gait Training:           mins  75310;     Ultrasound:          mins  23458;    Electrical Stimulation:         mins  61176 ( );    Untimed:  Electrical Stimulation:         mins   34148 ( );  Mechanical Traction:         mins  44853;     Timed Treatment:  40    mins   Total Treatment:     50   mins  Vonda Lee, MYKEL  Physical Therapist Assistant

## 2019-12-05 ENCOUNTER — TREATMENT (OUTPATIENT)
Dept: PHYSICAL THERAPY | Facility: CLINIC | Age: 54
End: 2019-12-05

## 2019-12-05 DIAGNOSIS — M79.631 PAIN OF RIGHT FOREARM: Primary | ICD-10-CM

## 2019-12-05 PROCEDURE — 97110 THERAPEUTIC EXERCISES: CPT | Performed by: PHYSICAL THERAPIST

## 2019-12-05 NOTE — PROGRESS NOTES
Physical Therapy Daily Progress Note    Patient: Sena Romero   : 1965  Diagnosis/ICD-10 Code:     Diagnosis Plan   1. Pain of right forearm       Referring practitioner: Connor Galeano MD  Date of Initial Visit: Type: THERAPY  Noted: 2019  Today's Date: 2019  Patient seen for 3 sessions      PT Recheck Due: 2019  PT MD Visit: 2019       Sena Romero         Subjective Evaluation    History of Present Illness    Subjective comment: arm locks up. only drinks diet 7 up and coffee during the day, no other form of Pain  Current pain ratin             Objective   See Exercise, Manual, and Modality Logs for complete treatment.       Assessment & Plan     Assessment  Assessment details: Patient has two episodes of spasms noted in right forarm during treatment today. First one occurred during wrist extension curls with 1# weight. Able to stretch into CT1/CT2 S to reduce symptoms. Second occurred during fine motor activity of small keyhole pegboard.     Goals  Plan Goals: Short Term Goals:  1) I with HEP and have additions/changes by next re-certification. (ongoing/progressing)    2) AROM B wrist extension>=70°. (ongoing/progressing)    3) AROM R wrist UD >= 40°. (ongoing/progressing)    4) AROM R wrist RD >= 25°. (ongoing/progressing)    5) R  at the #2 setting >= 30#. (ongoing/progress)    Long Term goals:  1) AROM for the R wrist all WNL, no increase in pain.    2) B wrist 5/5, no increase in pain.    3) R  at the #2 setting >= 35#.    4) patient able to perform 10 minutes of fine motor activities with no increase in pain.    5) I with final HEP    6) D/C with a final HEP and free 30 day fitness formula memebership.     Plan  Plan details: Next visit continue with nerve gliding and stretching and progress strengthening as symptoms allow.       Progress strengthening /stabilization /functional activity            Timed:  Manual Therapy:         mins  14057;  Therapeutic  Exercise:    40    mins  87810;   Aquatic Therex :        mins  51536    Neuromuscular Karolina:        mins  91092;    Therapeutic Activity:          mins  49634;     Gait Training:           mins  35664;     Ultrasound:          mins  30116;    Electrical Stimulation:         mins  82076 ( );    Untimed:  Electrical Stimulation:         mins  21718 ( );  Mechanical Traction:         mins  28428;   Orthotic fit/train:         mins  08364    Timed Treatment:   40   mins   Total Treatment:     55   mins  Argentina Rosales PTA  Physical Therapist Assistant

## 2019-12-11 ENCOUNTER — TELEPHONE (OUTPATIENT)
Dept: PHYSICAL THERAPY | Facility: CLINIC | Age: 54
End: 2019-12-11

## 2019-12-11 DIAGNOSIS — M79.631 PAIN OF RIGHT FOREARM: Primary | ICD-10-CM

## 2019-12-11 NOTE — TELEPHONE ENCOUNTER
Called secondary to no show for appointment. No answer. Voicemail left for patient to return call if she would like to reschedule

## 2019-12-18 ENCOUNTER — TELEPHONE (OUTPATIENT)
Dept: PHYSICAL THERAPY | Facility: CLINIC | Age: 54
End: 2019-12-18

## 2019-12-18 DIAGNOSIS — M79.631 PAIN OF RIGHT FOREARM: Primary | ICD-10-CM

## 2020-01-16 ENCOUNTER — DOCUMENTATION (OUTPATIENT)
Dept: PHYSICAL THERAPY | Facility: CLINIC | Age: 55
End: 2020-01-16

## 2020-01-16 DIAGNOSIS — M79.631 PAIN OF RIGHT FOREARM: Primary | ICD-10-CM

## 2020-01-16 RX ORDER — LISINOPRIL 10 MG/1
10 TABLET ORAL DAILY
Qty: 30 TABLET | Refills: 0 | Status: SHIPPED | OUTPATIENT
Start: 2020-01-16 | End: 2020-02-17

## 2020-02-05 DIAGNOSIS — I25.810 CORONARY ARTERY DISEASE INVOLVING CORONARY BYPASS GRAFT OF NATIVE HEART WITHOUT ANGINA PECTORIS: Primary | ICD-10-CM

## 2020-02-06 ENCOUNTER — OFFICE VISIT (OUTPATIENT)
Dept: CARDIOLOGY | Facility: CLINIC | Age: 55
End: 2020-02-06

## 2020-02-06 VITALS
HEART RATE: 68 BPM | WEIGHT: 250.2 LBS | OXYGEN SATURATION: 98 % | DIASTOLIC BLOOD PRESSURE: 82 MMHG | HEIGHT: 68 IN | SYSTOLIC BLOOD PRESSURE: 124 MMHG | BODY MASS INDEX: 37.92 KG/M2

## 2020-02-06 DIAGNOSIS — I25.729 CORONARY ARTERY DISEASE INVOLVING AUTOLOGOUS ARTERY CORONARY BYPASS GRAFT WITH ANGINA PECTORIS (HCC): Primary | ICD-10-CM

## 2020-02-06 PROCEDURE — 99214 OFFICE O/P EST MOD 30 MIN: CPT | Performed by: INTERNAL MEDICINE

## 2020-02-06 PROCEDURE — 93000 ELECTROCARDIOGRAM COMPLETE: CPT | Performed by: INTERNAL MEDICINE

## 2020-02-06 RX ORDER — TIZANIDINE 4 MG/1
1 TABLET ORAL 2 TIMES DAILY PRN
COMMUNITY

## 2020-02-06 RX ORDER — GABAPENTIN 800 MG/1
1 TABLET ORAL 3 TIMES DAILY PRN
COMMUNITY
Start: 2020-01-19

## 2020-02-06 RX ORDER — FAMOTIDINE 20 MG/1
20 TABLET, FILM COATED ORAL 2 TIMES DAILY
COMMUNITY

## 2020-02-06 RX ORDER — HYDROCODONE BITARTRATE AND ACETAMINOPHEN 10; 325 MG/1; MG/1
1 TABLET ORAL 3 TIMES DAILY PRN
COMMUNITY
Start: 2020-01-24

## 2020-02-06 RX ORDER — NITROGLYCERIN 0.4 MG/1
TABLET SUBLINGUAL
Qty: 100 TABLET | Refills: 11 | Status: ON HOLD | OUTPATIENT
Start: 2020-02-06 | End: 2020-02-26

## 2020-02-06 NOTE — PROGRESS NOTES
Sena Romero  55 y.o. female    2020  Chief Complaint   Patient presents with   • Follow-up       History of Present Illness      -Status post coronary bypass grafting surgery in .  She then had a heart attack and underwent a PCI and stent in .  She has been having some chest pain.  She describes it is on her left side.  She can just be driving a happen.  Nothing really takes away.  It can last for just seconds.  She has no nausea or diaphoresis with it.  He has no nitroglycerin so she has not been taking this.  She does not smoke.        SUBJECTIVE    No Known Allergies      Past Medical History:   Diagnosis Date   • Arthritis    • Chronic back pain     And leg, DJD      • Chronic depression 2014    With anxiety   • Chronic pain disorder    • COPD (chronic obstructive pulmonary disease) (CMS/[facility])    • Coronary arteriosclerosis 2014    3 vessel CABG in  then 1 stent in .  is followed by dr moncada   • Costal chondritis 2014   • Dyslipidemia    • Essential hypertension 2014   • GERD (gastroesophageal reflux disease)    • Glaucoma    • Hyperparathyroidism (CMS/[facility])    • Injury of back    • Low back pain    • Myocardial infarct (CMS/[facility])    • Obese 2014   • Pain of sternum 2014   • Primary hyperparathyroidism (CMS/[facility]) 2017   • Pseudoaneurysm of femoral artery (CMS/[facility]) 2015    Post-catheterization femoral pseudoaneurysm     • Vitamin D deficiency 2017         Past Surgical History:   Procedure Laterality Date   • BACK SURGERY      multiple   • CARDIAC CATHETERIZATION  2015    Cardiac cath 79377 (2)      •  SECTION      x 2   • CHOLECYSTECTOMY      Cholecystectomy (1)      • CORONARY ARTERY BYPASS GRAFT  2014    CABG (1)      • EXTRACORPOREAL SHOCK WAVE LITHOTRIPSY (ESWL)      multiple   • HYSTERECTOMY      Anesth, hysterectomy (1)      • INJECTION OF MEDICATION  2015    Percu Tx of Extrem Pseudoaneurysm US guided  20749 (1)      • MS EXPLORE PARATHYROID GLANDS Left 8/14/2017    Procedure: PARATHYROIDECTOMY;  Surgeon: Jason England MD;  Location: [facility];  Service: General   • TONSILLECTOMY           Family History   Problem Relation Age of Onset   • Diabetes Mother    • Hyperparathyroidism Mother    • Mental illness Father    • COPD Sister    • Osteoporosis Sister    • Hyperparathyroidism Sister    • Cancer Maternal Aunt    • Diabetes Maternal Aunt    • Diabetes Maternal Uncle    • Coronary artery disease Neg Hx          Social History     Socioeconomic History   • Marital status:      Spouse name: Not on file   • Number of children: Not on file   • Years of education: Not on file   • Highest education level: Not on file   Tobacco Use   • Smoking status: Former Smoker     Packs/day: 0.50     Types: Cigarettes     Last attempt to quit: 2017     Years since quitting: 3.0   • Smokeless tobacco: Never Used   Substance and Sexual Activity   • Alcohol use: No   • Drug use: No   • Sexual activity: Defer         Prior to Admission medications    Medication Sig Start Date End Date Taking? Authorizing Provider   ASPIRIN PO Take 81 mg by mouth Daily. 3/20/15  Yes Mookie Obrien MD   atorvastatin (LIPITOR) 20 MG tablet Take 1 tablet by mouth Daily. 1/16/19  Yes Lj Oconnell MD   carvedilol (COREG) 12.5 MG tablet TAKE ONE TABLET BY MOUTH TWICE A DAY 8/26/19  Yes Lj Oconnell MD   clopidogrel (PLAVIX) 75 MG tablet Take 1 tablet by mouth Daily. 1/16/19  Yes Lj Oconnell MD   famotidine (PEPCID) 20 MG tablet Take 20 mg by mouth 2 (Two) Times a Day.   Yes Mookie Obrien MD   gabapentin (NEURONTIN) 800 MG tablet Take 1 tablet by mouth 3 (Three) Times a Day As Needed. 1/19/20  Yes Mookie Obrien MD   HYDROcodone-acetaminophen (NORCO)  MG per tablet Take 1 tablet by mouth 3 (Three) Times a Day As Needed. 1/24/20  Yes Mookie Obrien MD   isosorbide mononitrate (IMDUR) 60 MG 24  "hr tablet Take 60 mg by mouth Daily.   Yes Mookie Obrien MD   lisinopril (PRINIVIL,ZESTRIL) 10 MG tablet Take 1 tablet by mouth Daily. 1/16/20  Yes Zohreh Shaw MD   spironolactone-hydrochlorothiazide (ALDACTAZIDE) 25-25 MG tablet Take 0.5 tablets by mouth Daily. 1/16/19  Yes Lj Oconnell MD   VENTOLIN  (90 BASE) MCG/ACT inhaler Inhale 2 puffs Every 4 (Four) Hours As Needed. 12/9/16  Yes Mookie Obrien MD   Tiotropium Bromide Monohydrate (SPIRIVA HANDIHALER IN) Inhale 1 puff Daily As Needed. 3/20/15   Mookie Obrien MD   tiZANidine (ZANAFLEX) 4 MG tablet Take 1 tablet by mouth 2 (Two) Times a Day As Needed.    Mookie Obrien MD   amLODIPine (NORVASC) 5 MG tablet Take 1 tablet by mouth Daily With Dinner. 1/16/19 2/6/20  Lj Oconnell MD         OBJECTIVE    /82 Comment: Patricia  Pulse 68   Ht 172.7 cm (68\")   Wt 113 kg (250 lb 3.2 oz)   SpO2 98%   BMI 38.04 kg/m²         Review of Systems     Constitutional:  Denies recent weight loss, weight gain, fever or chills, no change in exercise tolerance     HENT:  Denies any hearing loss, epistaxis, hoarseness, or difficulty speaking.     Eyes: Wears eyeglasses or contact lenses     Respiratory:  Denies dyspnea with exertion,no cough, wheezing, or hemoptysis.     Cardiovascular: Negative for palpations, chest pain, orthopnea, PND, peripheral edema, syncope, or claudication.     Gastrointestinal:  Denies change in bowel habits, dyspepsia, ulcer disease, hematochezia, or melena.     Endocrine: Negative for cold intolerance, heat intolerance, polydipsia, polyphagia and polyuria. Denies any history of weight change, heat/cold intolerance, polydipsia, polyuria     Genitourinary: Negative.      Musculoskeletal: Denies any history of arthritic symptoms or back problems     Skin:  Denies any change in hair or nails, rashes, or skin lesions.     Allergic/Immunologic: Negative.  Negative for environmental allergies, " food allergies and immunocompromised state.     Neurological:  Denies any history of recurrent headaches, strokes, TIA, or seizure disorder.     Hematological: Denies any food allergies, seasonal allergies, bleeding disorders, or lymphadenopathy.     Psychiatric/Behavioral: Denies any history of depression, substance abuse, or change in cognitive function.         Physical Exam     Constitutional: Cooperative, alert and oriented, well-developed, well-nourished, in no acute distress.     HENT:   Head: Normocephalic, normal hair patterns, no masses or tenderness.  Ears, Nose, and Throat: No gross abnormalities. No pallor or cyanosis. Dentition good.   Eyes: EOMS intact, PERRL, conjunctivae and lids unremarkable. Fundoscopic exam and visual fields not performed.   Neck: No palpable masses or adenopathy, no thyromegaly, no JVD, carotid pulses are full and equal bilaterally and without  Bruits.     Cardiovascular: Regular rhythm, S1 and S2 normal,  S4. Apical impulse not displaced. No murmurs, gallops, or rubs detected.     Pulmonary/Chest: Chest: normal symmetry, no tenderness to palpation, normal respiratory excursion, no intercostal retraction, no use of accessory muscles.            Pulmonary: Normal breath sounds. No rales or ronchi.    Abdominal: Abdomen soft, bowel sounds normoactive, no masses, no hepatosplenomegaly, non-tender, no bruits.     Musculoskeletal: No deformities, clubbing, cyanosis, erythema, or edema observed. There are no spinal abnormalities noted. Normal muscle strength and tone. Pulses full and equal in all extremities, no bruits auscultated.     Neurological: No gross motor or sensory deficits noted, affect appropriate, oriented to time, person, place.     Skin: Warm and dry to the touch, no apparent skin lesions or masses noted.     Psychiatric: She has a normal mood and affect. Her behavior is normal. Judgment and thought content normal.         Procedures      Lab Results   Component Value  Date    WBC 6.7 03/03/2015    HGB 12.7 03/03/2015    HCT 38.4 03/03/2015    MCV 90.0 03/03/2015     03/03/2015     Lab Results   Component Value Date    GLUCOSE 97 08/04/2017    BUN 12 08/04/2017    CREATININE 0.88 08/04/2017    EGFRIFNONA 67 08/04/2017    BCR 13.6 08/04/2017    CO2 24.0 08/04/2017    CALCIUM 10.3 (H) 08/14/2017    ALBUMIN 4.80 06/26/2017    AST 38 (H) 03/02/2015    ALT 36 03/02/2015     No results found for: CHOL  Lab Results   Component Value Date    TRIG 184 03/03/2015    TRIG 134 03/02/2015     Lab Results   Component Value Date    HDL 33 (L) 03/03/2015    HDL 42 (L) 03/02/2015     No components found for: LDLCALC  Lab Results   Component Value Date     03/03/2015     03/02/2015     No results found for: HDLLDLRATIO  No components found for: CHOLHDL  Lab Results   Component Value Date    HGBA1C 5.5 03/22/2014     Lab Results   Component Value Date    TSH 1.100 06/26/2017           ASSESSMENT AND PLAN      Sena was seen today for follow-up.    Diagnoses and all orders for this visit:    Coronary artery disease involving autologous artery coronary bypass graft with angina pectoris (CMS/[facility])  -    Given that she has recurrent chest pain and she status post bypass and also passed after this a stent and its been 5 years.  We will go ahead and do a Lexiscan Cardiolite.  We will make it a 2-day protocol to get the best images.  She had 1 of these about 2 to 3 years ago which was negative.  I will also give her sublingual nitroglycerin to take.  Stress Test With Myocardial Perfusion Two Day; Future    Other orders  -     nitroglycerin (NITROSTAT) 0.4 MG SL tablet; 1 under the tongue as needed for angina, may repeat q5mins for up three doses        Patient's Body mass index is 38.04 kg/m². BMI is above normal parameters. Recommendations include: none (medical contraindication).                Zohreh Shaw MD  2/6/2020  11:40 AM

## 2020-02-11 ENCOUNTER — APPOINTMENT (OUTPATIENT)
Dept: NUCLEAR MEDICINE | Facility: HOSPITAL | Age: 55
End: 2020-02-11

## 2020-02-12 ENCOUNTER — APPOINTMENT (OUTPATIENT)
Dept: NUCLEAR MEDICINE | Facility: HOSPITAL | Age: 55
End: 2020-02-12

## 2020-02-12 ENCOUNTER — APPOINTMENT (OUTPATIENT)
Dept: CARDIOLOGY | Facility: HOSPITAL | Age: 55
End: 2020-02-12

## 2020-02-17 RX ORDER — LISINOPRIL 10 MG/1
TABLET ORAL
Qty: 30 TABLET | Refills: 6 | Status: ON HOLD | OUTPATIENT
Start: 2020-02-17 | End: 2020-02-26

## 2020-02-18 ENCOUNTER — HOSPITAL ENCOUNTER (OUTPATIENT)
Dept: NUCLEAR MEDICINE | Facility: HOSPITAL | Age: 55
Discharge: HOME | End: 2020-02-18

## 2020-02-18 DIAGNOSIS — I25.729 CORONARY ARTERY DISEASE INVOLVING AUTOLOGOUS ARTERY CORONARY BYPASS GRAFT WITH ANGINA PECTORIS (HCC): ICD-10-CM

## 2020-02-18 PROCEDURE — 0 TECHNETIUM SESTAMIBI: Performed by: INTERNAL MEDICINE

## 2020-02-18 PROCEDURE — 78452 HT MUSCLE IMAGE SPECT MULT: CPT

## 2020-02-18 PROCEDURE — 93017 CV STRESS TEST TRACING ONLY: CPT

## 2020-02-18 PROCEDURE — A9500 TC99M SESTAMIBI: HCPCS | Performed by: INTERNAL MEDICINE

## 2020-02-18 RX ADMIN — TECHNETIUM TC 99M SESTAMIBI 1 DOSE: 1 INJECTION INTRAVENOUS at 07:53

## 2020-02-19 ENCOUNTER — HOSPITAL ENCOUNTER (OUTPATIENT)
Dept: NUCLEAR MEDICINE | Facility: HOSPITAL | Age: 55
Discharge: HOME | End: 2020-02-19

## 2020-02-19 ENCOUNTER — HOSPITAL ENCOUNTER (OUTPATIENT)
Dept: CARDIOLOGY | Facility: HOSPITAL | Age: 55
Discharge: HOME | End: 2020-02-19

## 2020-02-19 LAB
BH CV STRESS BP STAGE 1: NORMAL
BH CV STRESS COMMENTS STAGE 1: NORMAL
BH CV STRESS DOSE REGADENOSON STAGE 1: 0.4
BH CV STRESS DURATION MIN STAGE 1: 0
BH CV STRESS DURATION SEC STAGE 1: 10
BH CV STRESS HR STAGE 1: 74
BH CV STRESS PROTOCOL 1: NORMAL
BH CV STRESS RECOVERY BP: NORMAL MMHG
BH CV STRESS RECOVERY HR: 69 BPM
BH CV STRESS STAGE 1: 1
MAXIMAL PREDICTED HEART RATE: 165 BPM
PERCENT MAX PREDICTED HR: 54.55 %
STRESS BASELINE BP: NORMAL MMHG
STRESS BASELINE HR: 65 BPM
STRESS PERCENT HR: 64 %
STRESS POST ESTIMATED WORKLOAD: 1 METS
STRESS POST PEAK BP: NORMAL MMHG
STRESS POST PEAK HR: 90 BPM
STRESS TARGET HR: 140 BPM

## 2020-02-19 PROCEDURE — 0 TECHNETIUM SESTAMIBI: Performed by: INTERNAL MEDICINE

## 2020-02-19 PROCEDURE — A9500 TC99M SESTAMIBI: HCPCS | Performed by: INTERNAL MEDICINE

## 2020-02-19 PROCEDURE — 25010000002 REGADENOSON 0.4 MG/5ML SOLUTION: Performed by: INTERNAL MEDICINE

## 2020-02-19 PROCEDURE — 93016 CV STRESS TEST SUPVJ ONLY: CPT | Performed by: INTERNAL MEDICINE

## 2020-02-19 PROCEDURE — 93018 CV STRESS TEST I&R ONLY: CPT | Performed by: INTERNAL MEDICINE

## 2020-02-19 PROCEDURE — 78452 HT MUSCLE IMAGE SPECT MULT: CPT | Performed by: INTERNAL MEDICINE

## 2020-02-19 RX ORDER — SODIUM CHLORIDE 0.9 % (FLUSH) 0.9 %
10 SYRINGE (ML) INJECTION ONCE
Status: COMPLETED | OUTPATIENT
Start: 2020-02-19 | End: 2020-02-19

## 2020-02-19 RX ADMIN — SODIUM CHLORIDE, PRESERVATIVE FREE 10 ML: 5 INJECTION INTRAVENOUS at 08:51

## 2020-02-19 RX ADMIN — TECHNETIUM TC 99M SESTAMIBI 1 DOSE: 1 INJECTION INTRAVENOUS at 08:51

## 2020-02-19 RX ADMIN — REGADENOSON 0.4 MG: 0.08 INJECTION, SOLUTION INTRAVENOUS at 08:50

## 2020-02-21 ENCOUNTER — OFFICE VISIT (OUTPATIENT)
Dept: CARDIOLOGY | Facility: CLINIC | Age: 55
End: 2020-02-21

## 2020-02-21 VITALS
OXYGEN SATURATION: 98 % | HEIGHT: 68 IN | BODY MASS INDEX: 38.01 KG/M2 | HEART RATE: 67 BPM | WEIGHT: 250.8 LBS | SYSTOLIC BLOOD PRESSURE: 126 MMHG | DIASTOLIC BLOOD PRESSURE: 68 MMHG

## 2020-02-21 DIAGNOSIS — R07.2 PRECORDIAL PAIN: ICD-10-CM

## 2020-02-21 DIAGNOSIS — R94.39 ABNORMAL NUCLEAR STRESS TEST: Primary | ICD-10-CM

## 2020-02-21 DIAGNOSIS — I25.708 CORONARY ARTERY DISEASE OF BYPASS GRAFT OF NATIVE HEART WITH STABLE ANGINA PECTORIS (HCC): ICD-10-CM

## 2020-02-21 PROCEDURE — 99215 OFFICE O/P EST HI 40 MIN: CPT | Performed by: NURSE PRACTITIONER

## 2020-02-21 RX ORDER — SODIUM CHLORIDE 9 MG/ML
1-3 INJECTION, SOLUTION INTRAVENOUS CONTINUOUS
Status: CANCELLED | OUTPATIENT
Start: 2020-02-26

## 2020-02-21 RX ORDER — SODIUM CHLORIDE 0.9 % (FLUSH) 0.9 %
10 SYRINGE (ML) INJECTION EVERY 12 HOURS SCHEDULED
Status: CANCELLED | OUTPATIENT
Start: 2020-02-21

## 2020-02-21 RX ORDER — SODIUM CHLORIDE 0.9 % (FLUSH) 0.9 %
10 SYRINGE (ML) INJECTION AS NEEDED
Status: CANCELLED | OUTPATIENT
Start: 2020-02-21

## 2020-02-21 NOTE — PROGRESS NOTES
"Coronary Artery Disease and Results (chief complaint)      Coronary Artery Disease   Presents for follow-up visit. Symptoms include chest pain, chest pressure and chest tightness. Pertinent negatives include no dizziness, leg swelling, palpitations, shortness of breath or weight gain. The symptoms have been worsening. Compliance with diet is variable. Compliance with exercise is variable. Compliance with medications is good.       Dr. Shaw's HPI:  \"Status post coronary bypass grafting surgery in 2014.  She then had a heart attack and underwent a PCI and stent in 2015.  She has been having some chest pain.  She describes it is on her left side.  She can just be driving a happen.  Nothing really takes away.  It can last for just seconds.  She has no nausea or diaphoresis with it.  He has no nitroglycerin so she has not been taking this.  She does not smoke.\"    MS. Romero returns to clinic today for test results ordered by Dr. Shaw for the above complaints. She has had 2 nitro tablets the past week with resolution of symptoms.         Past Medical History:   Diagnosis Date   • Arthritis    • Chronic back pain     And leg, DJD      • Chronic depression 09/18/2014    With anxiety   • Chronic pain disorder    • COPD (chronic obstructive pulmonary disease) (CMS/[facility])    • Coronary arteriosclerosis 09/18/2014    3 vessel CABG in 2014 then 1 stent in 2015.  is followed by dr moncada   • Costal chondritis 07/02/2014   • Dyslipidemia    • Essential hypertension 06/17/2014   • GERD (gastroesophageal reflux disease)    • Glaucoma    • Hyperparathyroidism (CMS/[facility])    • Injury of back    • Low back pain    • Myocardial infarct (CMS/[facility])    • Obese 07/02/2014   • Pain of sternum 09/18/2014   • Primary hyperparathyroidism (CMS/[facility]) 5/25/2017   • Pseudoaneurysm of femoral artery (CMS/[facility]) 03/13/2015    Post-catheterization femoral pseudoaneurysm     • Vitamin D deficiency 5/25/2017     Past Surgical History:   Procedure " Laterality Date   • BACK SURGERY      multiple   • CARDIAC CATHETERIZATION  2015    Cardiac cath 61930 (2)      •  SECTION      x 2   • CHOLECYSTECTOMY      Cholecystectomy (1)      • CORONARY ARTERY BYPASS GRAFT  2014    CABG (1)      • EXTRACORPOREAL SHOCK WAVE LITHOTRIPSY (ESWL)      multiple   • HYSTERECTOMY      Anesth, hysterectomy (1)      • INJECTION OF MEDICATION  2015    Percu Tx of Extrem Pseudoaneurysm US guided 78252 (1)      • [place] EXPLORE PARATHYROID GLANDS Left 2017    Procedure: PARATHYROIDECTOMY;  Surgeon: Jason England MD;  Location: [facility];  Service: General   • TONSILLECTOMY       Social History     Socioeconomic History   • Marital status:      Spouse name: Not on file   • Number of children: Not on file   • Years of education: Not on file   • Highest education level: Not on file   Tobacco Use   • Smoking status: Former Smoker     Packs/day: 0.50     Types: Cigarettes     Last attempt to quit:      Years since quitting: 3.1   • Smokeless tobacco: Never Used   Substance and Sexual Activity   • Alcohol use: No   • Drug use: No   • Sexual activity: Defer     Family History   Problem Relation Age of Onset   • Diabetes Mother    • Hyperparathyroidism Mother    • Mental illness Father    • COPD Sister    • Osteoporosis Sister    • Hyperparathyroidism Sister    • Cancer Maternal Aunt    • Diabetes Maternal Aunt    • Diabetes Maternal Uncle    • Coronary artery disease Neg Hx        ALLERGIES:  No Known Allergies      Review of Systems   Constitution: Negative for chills, decreased appetite, fever and weight gain.   HENT: Negative.    Eyes: Negative.    Cardiovascular: Positive for chest pain. Negative for claudication, dyspnea on exertion, irregular heartbeat, leg swelling and palpitations.   Respiratory: Positive for chest tightness. Negative for cough, shortness of breath and wheezing.    Endocrine: Negative.    Skin: Negative for dry skin, flushing and  rash.   Musculoskeletal: Negative for falls and myalgias.   Gastrointestinal: Negative for abdominal pain, change in bowel habit and melena.   Genitourinary: Negative for frequency and hematuria.   Neurological: Negative for dizziness, light-headedness, loss of balance and weakness.   Psychiatric/Behavioral: Negative for altered mental status and memory loss. The patient is not nervous/anxious.        Current Outpatient Medications   Medication Sig Dispense Refill   • ASPIRIN PO Take 81 mg by mouth Daily.     • atorvastatin (LIPITOR) 20 MG tablet Take 1 tablet by mouth Daily. 30 tablet 12   • carvedilol (COREG) 12.5 MG tablet TAKE ONE TABLET BY MOUTH TWICE A  tablet 0   • clopidogrel (PLAVIX) 75 MG tablet Take 1 tablet by mouth Daily. 30 tablet 12   • famotidine (PEPCID) 20 MG tablet Take 20 mg by mouth 2 (Two) Times a Day.     • gabapentin (NEURONTIN) 800 MG tablet Take 1 tablet by mouth 3 (Three) Times a Day As Needed.     • HYDROcodone-acetaminophen (NORCO)  MG per tablet Take 1 tablet by mouth 3 (Three) Times a Day As Needed.     • isosorbide mononitrate (IMDUR) 60 MG 24 hr tablet Take 60 mg by mouth Daily.     • lisinopril (PRINIVIL,ZESTRIL) 10 MG tablet TAKE ONE TABLET BY MOUTH DAILY 30 tablet 6   • nitroglycerin (NITROSTAT) 0.4 MG SL tablet 1 under the tongue as needed for angina, may repeat q5mins for up three doses 100 tablet 11   • spironolactone-hydrochlorothiazide (ALDACTAZIDE) 25-25 MG tablet Take 0.5 tablets by mouth Daily. 30 tablet 12   • Tiotropium Bromide Monohydrate (SPIRIVA HANDIHALER IN) Inhale 1 puff Daily As Needed.     • tiZANidine (ZANAFLEX) 4 MG tablet Take 1 tablet by mouth 2 (Two) Times a Day As Needed.     • VENTOLIN  (90 BASE) MCG/ACT inhaler Inhale 2 puffs Every 4 (Four) Hours As Needed.       No current facility-administered medications for this visit.        OBJECTIVE:    Physical Exam:   Physical Exam   Constitutional: She is oriented to person, place, and time.  "She appears well-developed and well-nourished. No distress.   HENT:   Head: Normocephalic and atraumatic.   Neck: Normal range of motion. No JVD present.   Cardiovascular: Normal rate, regular rhythm, S1 normal, S2 normal, normal heart sounds and intact distal pulses.   No murmur heard.  Pulmonary/Chest: Effort normal and breath sounds normal. No respiratory distress. She has no wheezes. She has no rales.   Abdominal: Soft. Bowel sounds are normal. She exhibits no distension.   Musculoskeletal: Normal range of motion. She exhibits no edema.   Neurological: She is alert and oriented to person, place, and time.   Skin: Skin is warm and dry. No erythema.   Psychiatric: She has a normal mood and affect. Her behavior is normal. Judgment and thought content normal.     Vitals:    02/21/20 1327   BP: 126/68   BP Location: Left arm   Patient Position: Sitting   Cuff Size: Adult   Pulse: 67   SpO2: 98%   Weight: 114 kg (250 lb 12.8 oz)   Height: 172.7 cm (68\")       DATA REVIEWED:   Results for orders placed in visit on 01/16/19   Adult Transthoracic Echo Complete W/ Cont if Necessary Per Protocol    Narrative · Estimated EF = 61%.  · Left ventricular systolic function is normal.  · Left ventricular diastolic dysfunction (grade I) consistent with   impaired relaxation.  · The tricuspid valve is grossly normal. Trace to mild tricuspid valve   regurgitation is present  · The mitral valve is grossly normal in structure. Trace mitral valve   regurgitation is present        No radiology results for the last 30 days.       Labs: BMP, CBC, LIPID, TSH  Lab Results   Component Value Date    GLUCOSE 97 08/04/2017    CALCIUM 10.3 (H) 08/14/2017     08/04/2017    K 3.9 08/04/2017    CO2 24.0 08/04/2017     08/04/2017    BUN 12 08/04/2017    CREATININE 0.88 08/04/2017    EGFRIFNONA 67 08/04/2017    BCR 13.6 08/04/2017    ANIONGAP 13.0 08/04/2017     Lab Results   Component Value Date    WBC 6.7 03/03/2015    HGB 12.7 " 03/03/2015    HCT 38.4 03/03/2015    MCV 90.0 03/03/2015     03/03/2015     No results found for: CHOL  Lab Results   Component Value Date    TRIG 184 03/03/2015    TRIG 134 03/02/2015     Lab Results   Component Value Date    HDL 33 (L) 03/03/2015    HDL 42 (L) 03/02/2015     No components found for: LDLCALC  Lab Results   Component Value Date     03/03/2015     03/02/2015     No results found for: HDLLDLRATIO  No components found for: CHOLHDL  Lab Results   Component Value Date    TSH 1.100 06/26/2017     No results found for: PROBNP    STRESS 2/19/20  Interpretation Summary     · Myocardial perfusion imaging indicates a small-to-medium-sized, mild-to-moderately severe area of ischemia located in the anterior wall and lateral wall. Impressions are consistent with an intermediate risk study.     STRESS 2017          The following portions of the patient's history were reviewed and updated as appropriate: allergies, current medications, past family history, past medical history, past social history, past surgical history and problem list.  Old records reviewed and pertinent information is included in the above objective data.     ASSESSMENT/PLAN:    Labs morning of- CBC, CMP, ptt, lipid, a1c.      Diagnosis Plan   1.  stable angina with CAD Nitro SL. Has had 2 doses. On Imdur 60mg       2. Abnormal nuclear stress test  Ms. Romero has been recommended to undergo LHC secondary to abnormal nuclear. She is agreeable to proceed.     The indications, risks and benefits of diagnostic left heart cardiac catheterization, angiography, conscious sedation, and possible blood transfusion were discussed in detail with the patient. The potential complications of 1/2000 chance of death, 1/1000 chance of heart attack or stroke, 1/500 chance of bleeding or clotting of the femoral artery, and 1/500 chance of allergic reaction to contrast were discussed. We also reviewed possible complications of infection and  kidney dysfunction. If PCI were performed and intra-coronary stents indicated, we discussed the details about  CORRINA. This included a review of the risks of the infrequent, but relatively higher incidence of late thrombosis with CORRINA. The importance of maintaining a consistent daily regimen of aspirin and an additional anti-platelet agent  for as long as directed after implantation was emphasized. No contraindications were found.  The patient  appeared to understand and agree to the above.    [facility] with Dr. Colin Bailey 2/26/20. Orders placed.   - no DM medications  - no anticoagulation  - can hold fluid pill the morning of C  - written instructions given.  - consent              This document has been electronically signed by EMILIA Ryan on February 21, 2020 2:30 PM

## 2020-02-26 ENCOUNTER — HOSPITAL ENCOUNTER (OUTPATIENT)
Facility: HOSPITAL | Age: 55
Discharge: HOME | End: 2020-02-27
Attending: INTERNAL MEDICINE | Admitting: INTERNAL MEDICINE

## 2020-02-26 DIAGNOSIS — R07.2 PRECORDIAL PAIN: ICD-10-CM

## 2020-02-26 DIAGNOSIS — R94.39 ABNORMAL NUCLEAR STRESS TEST: ICD-10-CM

## 2020-02-26 DIAGNOSIS — I20.9 ANGINA PECTORIS (HCC): Primary | ICD-10-CM

## 2020-02-26 LAB
ACT BLD: 150 SECONDS (ref 82–152)
ALBUMIN SERPL-MCNC: 4.3 G/DL (ref 3.5–5.2)
ALBUMIN/GLOB SERPL: 1.4 G/DL
ALP SERPL-CCNC: 69 U/L (ref 39–117)
ALT SERPL W P-5'-P-CCNC: 24 U/L (ref 1–33)
ANION GAP SERPL CALCULATED.3IONS-SCNC: 13 MMOL/L (ref 5–15)
APTT PPP: 26 SECONDS (ref 20–40.3)
AST SERPL-CCNC: 19 U/L (ref 1–32)
BASOPHILS # BLD AUTO: 0.06 10*3/MM3 (ref 0–0.2)
BASOPHILS NFR BLD AUTO: 1 % (ref 0–1.5)
BILIRUB SERPL-MCNC: 0.4 MG/DL (ref 0.2–1.2)
BUN BLD-MCNC: 15 MG/DL (ref 6–20)
BUN/CREAT SERPL: 15 (ref 7–25)
CALCIUM SPEC-SCNC: 9.2 MG/DL (ref 8.6–10.5)
CHLORIDE SERPL-SCNC: 102 MMOL/L (ref 98–107)
CHOLEST SERPL-MCNC: 149 MG/DL (ref 0–200)
CO2 SERPL-SCNC: 23 MMOL/L (ref 22–29)
CREAT BLD-MCNC: 1 MG/DL (ref 0.57–1)
DEPRECATED RDW RBC AUTO: 39.2 FL (ref 37–54)
EOSINOPHIL # BLD AUTO: 0.1 10*3/MM3 (ref 0–0.4)
EOSINOPHIL NFR BLD AUTO: 1.6 % (ref 0.3–6.2)
ERYTHROCYTE [DISTWIDTH] IN BLOOD BY AUTOMATED COUNT: 12.3 % (ref 12.3–15.4)
GFR SERPL CREATININE-BSD FRML MDRD: 58 ML/MIN/1.73
GLOBULIN UR ELPH-MCNC: 3.1 GM/DL
GLUCOSE BLD-MCNC: 157 MG/DL (ref 65–99)
HBA1C MFR BLD: 7.1 % (ref 4.8–5.6)
HCT VFR BLD AUTO: 39 % (ref 34–46.6)
HDLC SERPL-MCNC: 49 MG/DL (ref 40–60)
HGB BLD-MCNC: 13.3 G/DL (ref 12–15.9)
IMM GRANULOCYTES # BLD AUTO: 0.02 10*3/MM3 (ref 0–0.05)
IMM GRANULOCYTES NFR BLD AUTO: 0.3 % (ref 0–0.5)
LDLC SERPL CALC-MCNC: 77 MG/DL (ref 0–100)
LDLC/HDLC SERPL: 1.57 {RATIO}
LYMPHOCYTES # BLD AUTO: 2.12 10*3/MM3 (ref 0.7–3.1)
LYMPHOCYTES NFR BLD AUTO: 34.1 % (ref 19.6–45.3)
MCH RBC QN AUTO: 29.6 PG (ref 26.6–33)
MCHC RBC AUTO-ENTMCNC: 34.1 G/DL (ref 31.5–35.7)
MCV RBC AUTO: 86.9 FL (ref 79–97)
MONOCYTES # BLD AUTO: 0.33 10*3/MM3 (ref 0.1–0.9)
MONOCYTES NFR BLD AUTO: 5.3 % (ref 5–12)
NEUTROPHILS # BLD AUTO: 3.58 10*3/MM3 (ref 1.7–7)
NEUTROPHILS NFR BLD AUTO: 57.7 % (ref 42.7–76)
NRBC BLD AUTO-RTO: 0 /100 WBC (ref 0–0.2)
PLATELET # BLD AUTO: 209 10*3/MM3 (ref 140–450)
PMV BLD AUTO: 11.8 FL (ref 6–12)
POTASSIUM BLD-SCNC: 4 MMOL/L (ref 3.5–5.2)
PROT SERPL-MCNC: 7.4 G/DL (ref 6–8.5)
RBC # BLD AUTO: 4.49 10*6/MM3 (ref 3.77–5.28)
SODIUM BLD-SCNC: 138 MMOL/L (ref 136–145)
TRIGL SERPL-MCNC: 116 MG/DL (ref 0–150)
VLDLC SERPL-MCNC: 23.2 MG/DL
WBC NRBC COR # BLD: 6.21 10*3/MM3 (ref 3.4–10.8)

## 2020-02-26 PROCEDURE — C1769 GUIDE WIRE: HCPCS

## 2020-02-26 PROCEDURE — 25010000002 FENTANYL CITRATE (PF) 100 MCG/2ML SOLUTION: Performed by: INTERNAL MEDICINE

## 2020-02-26 PROCEDURE — 80061 LIPID PANEL: CPT | Performed by: NURSE PRACTITIONER

## 2020-02-26 PROCEDURE — C1725 CATH, TRANSLUMIN NON-LASER: HCPCS | Performed by: INTERNAL MEDICINE

## 2020-02-26 PROCEDURE — 85347 COAGULATION TIME ACTIVATED: CPT

## 2020-02-26 PROCEDURE — 93459 L HRT ART/GRFT ANGIO: CPT | Performed by: INTERNAL MEDICINE

## 2020-02-26 PROCEDURE — 80053 COMPREHEN METABOLIC PANEL: CPT | Performed by: NURSE PRACTITIONER

## 2020-02-26 PROCEDURE — 85025 COMPLETE CBC W/AUTO DIFF WBC: CPT | Performed by: NURSE PRACTITIONER

## 2020-02-26 PROCEDURE — 25010000002 MORPHINE PER 10 MG: Performed by: INTERNAL MEDICINE

## 2020-02-26 PROCEDURE — 25010000002 BIVALIRUDIN TRIFLUOROACETATE 250 MG RECONSTITUTED SOLUTION: Performed by: INTERNAL MEDICINE

## 2020-02-26 PROCEDURE — C1894 INTRO/SHEATH, NON-LASER: HCPCS | Performed by: INTERNAL MEDICINE

## 2020-02-26 PROCEDURE — 25010000002 BIVALIRUDIN TRIFLUOROACETATE 250 MG RECONSTITUTED SOLUTION 1 EACH VIAL: Performed by: INTERNAL MEDICINE

## 2020-02-26 PROCEDURE — 25010000002 MIDAZOLAM PER 1 MG: Performed by: INTERNAL MEDICINE

## 2020-02-26 PROCEDURE — 85730 THROMBOPLASTIN TIME PARTIAL: CPT | Performed by: NURSE PRACTITIONER

## 2020-02-26 PROCEDURE — C1769 GUIDE WIRE: HCPCS | Performed by: INTERNAL MEDICINE

## 2020-02-26 PROCEDURE — C1887 CATHETER, GUIDING: HCPCS | Performed by: INTERNAL MEDICINE

## 2020-02-26 PROCEDURE — C1760 CLOSURE DEV, VASC: HCPCS | Performed by: INTERNAL MEDICINE

## 2020-02-26 PROCEDURE — 83036 HEMOGLOBIN GLYCOSYLATED A1C: CPT | Performed by: NURSE PRACTITIONER

## 2020-02-26 PROCEDURE — 94799 UNLISTED PULMONARY SVC/PX: CPT

## 2020-02-26 PROCEDURE — 0 IOPAMIDOL PER 1 ML: Performed by: INTERNAL MEDICINE

## 2020-02-26 RX ORDER — BISACODYL 5 MG/1
5 TABLET, DELAYED RELEASE ORAL DAILY PRN
Status: DISCONTINUED | OUTPATIENT
Start: 2020-02-26 | End: 2020-02-27 | Stop reason: HOSPADM

## 2020-02-26 RX ORDER — HYDROCODONE BITARTRATE AND ACETAMINOPHEN 10; 325 MG/1; MG/1
1 TABLET ORAL 3 TIMES DAILY PRN
Status: DISCONTINUED | OUTPATIENT
Start: 2020-02-26 | End: 2020-02-26

## 2020-02-26 RX ORDER — TIZANIDINE 4 MG/1
4 TABLET ORAL 2 TIMES DAILY PRN
Status: DISCONTINUED | OUTPATIENT
Start: 2020-02-26 | End: 2020-02-27 | Stop reason: HOSPADM

## 2020-02-26 RX ORDER — CARVEDILOL 12.5 MG/1
12.5 TABLET ORAL 2 TIMES DAILY WITH MEALS
COMMUNITY
End: 2021-01-12 | Stop reason: SDUPTHER

## 2020-02-26 RX ORDER — LIDOCAINE HYDROCHLORIDE 20 MG/ML
INJECTION, SOLUTION INFILTRATION; PERINEURAL AS NEEDED
Status: DISCONTINUED | OUTPATIENT
Start: 2020-02-26 | End: 2020-02-26 | Stop reason: HOSPADM

## 2020-02-26 RX ORDER — ISOSORBIDE MONONITRATE 60 MG/1
60 TABLET, EXTENDED RELEASE ORAL DAILY
Status: DISCONTINUED | OUTPATIENT
Start: 2020-02-26 | End: 2020-02-27 | Stop reason: HOSPADM

## 2020-02-26 RX ORDER — LISINOPRIL 10 MG/1
10 TABLET ORAL DAILY
COMMUNITY
End: 2020-09-08

## 2020-02-26 RX ORDER — FENTANYL CITRATE 50 UG/ML
25 INJECTION, SOLUTION INTRAMUSCULAR; INTRAVENOUS
Status: DISCONTINUED | OUTPATIENT
Start: 2020-02-26 | End: 2020-02-26

## 2020-02-26 RX ORDER — BISACODYL 10 MG
10 SUPPOSITORY, RECTAL RECTAL DAILY PRN
Status: DISCONTINUED | OUTPATIENT
Start: 2020-02-26 | End: 2020-02-27 | Stop reason: HOSPADM

## 2020-02-26 RX ORDER — CARVEDILOL 12.5 MG/1
12.5 TABLET ORAL 2 TIMES DAILY WITH MEALS
Status: DISCONTINUED | OUTPATIENT
Start: 2020-02-26 | End: 2020-02-27 | Stop reason: HOSPADM

## 2020-02-26 RX ORDER — FAMOTIDINE 20 MG/1
20 TABLET, FILM COATED ORAL DAILY
Status: DISCONTINUED | OUTPATIENT
Start: 2020-02-26 | End: 2020-02-27 | Stop reason: HOSPADM

## 2020-02-26 RX ORDER — ONDANSETRON 2 MG/ML
4 INJECTION INTRAMUSCULAR; INTRAVENOUS EVERY 6 HOURS PRN
Status: DISCONTINUED | OUTPATIENT
Start: 2020-02-26 | End: 2020-02-27 | Stop reason: HOSPADM

## 2020-02-26 RX ORDER — FENTANYL CITRATE 50 UG/ML
INJECTION, SOLUTION INTRAMUSCULAR; INTRAVENOUS
Status: DISPENSED
Start: 2020-02-26 | End: 2020-02-27

## 2020-02-26 RX ORDER — TRIAMTERENE AND HYDROCHLOROTHIAZIDE 37.5; 25 MG/1; MG/1
1 TABLET ORAL DAILY
Status: DISCONTINUED | OUTPATIENT
Start: 2020-02-26 | End: 2020-02-27 | Stop reason: HOSPADM

## 2020-02-26 RX ORDER — FENTANYL CITRATE 50 UG/ML
INJECTION, SOLUTION INTRAMUSCULAR; INTRAVENOUS AS NEEDED
Status: DISCONTINUED | OUTPATIENT
Start: 2020-02-26 | End: 2020-02-26 | Stop reason: HOSPADM

## 2020-02-26 RX ORDER — NITROGLYCERIN 0.4 MG/1
0.4 TABLET SUBLINGUAL
COMMUNITY
End: 2022-05-16 | Stop reason: SDUPTHER

## 2020-02-26 RX ORDER — SODIUM CHLORIDE 9 MG/ML
100 INJECTION, SOLUTION INTRAVENOUS CONTINUOUS
Status: DISCONTINUED | OUTPATIENT
Start: 2020-02-26 | End: 2020-02-27

## 2020-02-26 RX ORDER — ACETAMINOPHEN 325 MG/1
650 TABLET ORAL EVERY 4 HOURS PRN
Status: DISCONTINUED | OUTPATIENT
Start: 2020-02-26 | End: 2020-02-27 | Stop reason: HOSPADM

## 2020-02-26 RX ORDER — HYDROCODONE BITARTRATE AND ACETAMINOPHEN 5; 325 MG/1; MG/1
1 TABLET ORAL EVERY 4 HOURS PRN
Status: DISCONTINUED | OUTPATIENT
Start: 2020-02-26 | End: 2020-02-27

## 2020-02-26 RX ORDER — SODIUM CHLORIDE 9 MG/ML
1-3 INJECTION, SOLUTION INTRAVENOUS CONTINUOUS
Status: DISCONTINUED | OUTPATIENT
Start: 2020-02-26 | End: 2020-02-26

## 2020-02-26 RX ORDER — ONDANSETRON 4 MG/1
4 TABLET, FILM COATED ORAL EVERY 6 HOURS PRN
Status: DISCONTINUED | OUTPATIENT
Start: 2020-02-26 | End: 2020-02-27 | Stop reason: HOSPADM

## 2020-02-26 RX ORDER — AMOXICILLIN 250 MG
2 CAPSULE ORAL 2 TIMES DAILY
Status: DISCONTINUED | OUTPATIENT
Start: 2020-02-26 | End: 2020-02-27 | Stop reason: HOSPADM

## 2020-02-26 RX ORDER — LISINOPRIL 10 MG/1
10 TABLET ORAL DAILY
Status: DISCONTINUED | OUTPATIENT
Start: 2020-02-26 | End: 2020-02-27 | Stop reason: HOSPADM

## 2020-02-26 RX ORDER — NALOXONE HCL 0.4 MG/ML
0.4 VIAL (ML) INJECTION
Status: DISCONTINUED | OUTPATIENT
Start: 2020-02-26 | End: 2020-02-26

## 2020-02-26 RX ORDER — ATORVASTATIN CALCIUM 20 MG/1
20 TABLET, FILM COATED ORAL DAILY
Status: DISCONTINUED | OUTPATIENT
Start: 2020-02-26 | End: 2020-02-27 | Stop reason: HOSPADM

## 2020-02-26 RX ORDER — CLOPIDOGREL BISULFATE 75 MG/1
75 TABLET ORAL DAILY
Status: DISCONTINUED | OUTPATIENT
Start: 2020-02-26 | End: 2020-02-27 | Stop reason: HOSPADM

## 2020-02-26 RX ORDER — MORPHINE SULFATE 2 MG/ML
2 INJECTION, SOLUTION INTRAMUSCULAR; INTRAVENOUS
Status: DISCONTINUED | OUTPATIENT
Start: 2020-02-26 | End: 2020-02-27 | Stop reason: HOSPADM

## 2020-02-26 RX ORDER — ALUMINA, MAGNESIA, AND SIMETHICONE 2400; 2400; 240 MG/30ML; MG/30ML; MG/30ML
15 SUSPENSION ORAL EVERY 6 HOURS PRN
Status: DISCONTINUED | OUTPATIENT
Start: 2020-02-26 | End: 2020-02-27 | Stop reason: HOSPADM

## 2020-02-26 RX ORDER — ASPIRIN 81 MG/1
81 TABLET, CHEWABLE ORAL DAILY
Status: DISCONTINUED | OUTPATIENT
Start: 2020-02-26 | End: 2020-02-27 | Stop reason: HOSPADM

## 2020-02-26 RX ORDER — MIDAZOLAM HYDROCHLORIDE 1 MG/ML
INJECTION INTRAMUSCULAR; INTRAVENOUS AS NEEDED
Status: DISCONTINUED | OUTPATIENT
Start: 2020-02-26 | End: 2020-02-26 | Stop reason: HOSPADM

## 2020-02-26 RX ORDER — ALBUTEROL SULFATE 2.5 MG/3ML
2.5 SOLUTION RESPIRATORY (INHALATION) EVERY 6 HOURS PRN
Status: DISCONTINUED | OUTPATIENT
Start: 2020-02-26 | End: 2020-02-27 | Stop reason: HOSPADM

## 2020-02-26 RX ORDER — GABAPENTIN 400 MG/1
400 CAPSULE ORAL EVERY 12 HOURS SCHEDULED
Status: DISCONTINUED | OUTPATIENT
Start: 2020-02-26 | End: 2020-02-27 | Stop reason: HOSPADM

## 2020-02-26 RX ORDER — NITROGLYCERIN 0.4 MG/1
0.4 TABLET SUBLINGUAL
Status: DISCONTINUED | OUTPATIENT
Start: 2020-02-26 | End: 2020-02-27 | Stop reason: HOSPADM

## 2020-02-26 RX ORDER — BIVALIRUDIN 250 MG/5ML
INJECTION, POWDER, LYOPHILIZED, FOR SOLUTION INTRAVENOUS AS NEEDED
Status: DISCONTINUED | OUTPATIENT
Start: 2020-02-26 | End: 2020-02-26 | Stop reason: HOSPADM

## 2020-02-26 RX ADMIN — SODIUM CHLORIDE 3 ML/KG/HR: 9 INJECTION, SOLUTION INTRAVENOUS at 09:32

## 2020-02-26 RX ADMIN — TRIAMTERENE AND HYDROCHLOROTHIAZIDE 1 TABLET: 37.5; 25 TABLET ORAL at 20:11

## 2020-02-26 RX ADMIN — LISINOPRIL 10 MG: 10 TABLET ORAL at 20:12

## 2020-02-26 RX ADMIN — MORPHINE SULFATE 4 MG: 4 INJECTION, SOLUTION INTRAMUSCULAR; INTRAVENOUS at 17:42

## 2020-02-26 RX ADMIN — ATORVASTATIN CALCIUM 20 MG: 20 TABLET, FILM COATED ORAL at 20:11

## 2020-02-26 RX ADMIN — HYDROCODONE BITARTRATE AND ACETAMINOPHEN 1 TABLET: 10; 325 TABLET ORAL at 15:25

## 2020-02-26 RX ADMIN — FAMOTIDINE 20 MG: 20 TABLET ORAL at 20:11

## 2020-02-26 RX ADMIN — ASPIRIN 81 MG 81 MG: 81 TABLET ORAL at 20:12

## 2020-02-26 RX ADMIN — GABAPENTIN 400 MG: 400 CAPSULE ORAL at 20:11

## 2020-02-26 RX ADMIN — MORPHINE SULFATE 4 MG: 4 INJECTION, SOLUTION INTRAMUSCULAR; INTRAVENOUS at 19:49

## 2020-02-26 RX ADMIN — FENTANYL CITRATE 25 MCG: 50 INJECTION, SOLUTION INTRAMUSCULAR; INTRAVENOUS at 14:58

## 2020-02-26 RX ADMIN — CLOPIDOGREL BISULFATE 75 MG: 75 TABLET ORAL at 20:12

## 2020-02-26 RX ADMIN — SENNOSIDES AND DOCUSATE SODIUM 2 TABLET: 8.6; 5 TABLET ORAL at 20:12

## 2020-02-26 RX ADMIN — HYDROCODONE BITARTRATE AND ACETAMINOPHEN 1 TABLET: 5; 325 TABLET ORAL at 19:47

## 2020-02-26 RX ADMIN — MORPHINE SULFATE 4 MG: 4 INJECTION, SOLUTION INTRAMUSCULAR; INTRAVENOUS at 22:31

## 2020-02-27 ENCOUNTER — APPOINTMENT (OUTPATIENT)
Dept: ULTRASOUND IMAGING | Facility: HOSPITAL | Age: 55
End: 2020-02-27

## 2020-02-27 VITALS
BODY MASS INDEX: 38.46 KG/M2 | HEIGHT: 68 IN | HEART RATE: 67 BPM | TEMPERATURE: 97.9 F | WEIGHT: 253.8 LBS | RESPIRATION RATE: 18 BRPM | OXYGEN SATURATION: 97 % | SYSTOLIC BLOOD PRESSURE: 109 MMHG | DIASTOLIC BLOOD PRESSURE: 56 MMHG

## 2020-02-27 PROBLEM — E11.9 DIABETES MELLITUS, NEW ONSET: Status: ACTIVE | Noted: 2020-02-27

## 2020-02-27 LAB
ANION GAP SERPL CALCULATED.3IONS-SCNC: 12 MMOL/L (ref 5–15)
BUN BLD-MCNC: 14 MG/DL (ref 6–20)
BUN/CREAT SERPL: 14.3 (ref 7–25)
CALCIUM SPEC-SCNC: 8.5 MG/DL (ref 8.6–10.5)
CHLORIDE SERPL-SCNC: 101 MMOL/L (ref 98–107)
CHOLEST SERPL-MCNC: 118 MG/DL (ref 0–200)
CO2 SERPL-SCNC: 24 MMOL/L (ref 22–29)
CREAT BLD-MCNC: 0.98 MG/DL (ref 0.57–1)
DEPRECATED RDW RBC AUTO: 40.2 FL (ref 37–54)
ERYTHROCYTE [DISTWIDTH] IN BLOOD BY AUTOMATED COUNT: 12.6 % (ref 12.3–15.4)
GFR SERPL CREATININE-BSD FRML MDRD: 59 ML/MIN/1.73
GLUCOSE BLD-MCNC: 127 MG/DL (ref 65–99)
HCT VFR BLD AUTO: 33.3 % (ref 34–46.6)
HDLC SERPL-MCNC: 40 MG/DL (ref 40–60)
HGB BLD-MCNC: 11.4 G/DL (ref 12–15.9)
LDLC SERPL CALC-MCNC: 58 MG/DL (ref 0–100)
LDLC/HDLC SERPL: 1.46 {RATIO}
MCH RBC QN AUTO: 30.1 PG (ref 26.6–33)
MCHC RBC AUTO-ENTMCNC: 34.2 G/DL (ref 31.5–35.7)
MCV RBC AUTO: 87.9 FL (ref 79–97)
PLATELET # BLD AUTO: 199 10*3/MM3 (ref 140–450)
PMV BLD AUTO: 12.2 FL (ref 6–12)
POTASSIUM BLD-SCNC: 4 MMOL/L (ref 3.5–5.2)
RBC # BLD AUTO: 3.79 10*6/MM3 (ref 3.77–5.28)
SODIUM BLD-SCNC: 137 MMOL/L (ref 136–145)
TRIGL SERPL-MCNC: 99 MG/DL (ref 0–150)
VLDLC SERPL-MCNC: 19.8 MG/DL
WBC NRBC COR # BLD: 8.27 10*3/MM3 (ref 3.4–10.8)

## 2020-02-27 PROCEDURE — 99214 OFFICE O/P EST MOD 30 MIN: CPT | Performed by: NURSE PRACTITIONER

## 2020-02-27 PROCEDURE — 80048 BASIC METABOLIC PNL TOTAL CA: CPT | Performed by: INTERNAL MEDICINE

## 2020-02-27 PROCEDURE — 93926 LOWER EXTREMITY STUDY: CPT

## 2020-02-27 PROCEDURE — 85027 COMPLETE CBC AUTOMATED: CPT | Performed by: INTERNAL MEDICINE

## 2020-02-27 PROCEDURE — 80061 LIPID PANEL: CPT | Performed by: INTERNAL MEDICINE

## 2020-02-27 RX ORDER — HYDROCODONE BITARTRATE AND ACETAMINOPHEN 5; 325 MG/1; MG/1
2 TABLET ORAL EVERY 4 HOURS PRN
Status: DISCONTINUED | OUTPATIENT
Start: 2020-02-27 | End: 2020-02-27 | Stop reason: HOSPADM

## 2020-02-27 RX ADMIN — HYDROCODONE BITARTRATE AND ACETAMINOPHEN 1 TABLET: 5; 325 TABLET ORAL at 08:25

## 2020-02-27 RX ADMIN — ISOSORBIDE MONONITRATE 60 MG: 60 TABLET, EXTENDED RELEASE ORAL at 08:26

## 2020-02-27 RX ADMIN — GABAPENTIN 400 MG: 400 CAPSULE ORAL at 08:26

## 2020-02-27 RX ADMIN — ASPIRIN 81 MG 81 MG: 81 TABLET ORAL at 08:26

## 2020-02-27 RX ADMIN — LISINOPRIL 10 MG: 10 TABLET ORAL at 08:25

## 2020-02-27 RX ADMIN — SENNOSIDES AND DOCUSATE SODIUM 2 TABLET: 8.6; 5 TABLET ORAL at 08:26

## 2020-02-27 RX ADMIN — FAMOTIDINE 20 MG: 20 TABLET ORAL at 08:26

## 2020-02-27 RX ADMIN — CLOPIDOGREL BISULFATE 75 MG: 75 TABLET ORAL at 08:26

## 2020-02-27 RX ADMIN — CARVEDILOL 12.5 MG: 12.5 TABLET, FILM COATED ORAL at 08:25

## 2020-02-27 RX ADMIN — ATORVASTATIN CALCIUM 20 MG: 20 TABLET, FILM COATED ORAL at 08:26

## 2020-02-27 RX ADMIN — HYDROCODONE BITARTRATE AND ACETAMINOPHEN 1 TABLET: 5; 325 TABLET ORAL at 00:40

## 2020-02-27 RX ADMIN — SODIUM CHLORIDE 100 ML/HR: 9 INJECTION, SOLUTION INTRAVENOUS at 01:39

## 2020-02-28 ENCOUNTER — DOCUMENTATION (OUTPATIENT)
Dept: CARDIAC REHAB | Facility: HOSPITAL | Age: 55
End: 2020-02-28

## 2020-03-02 RX ORDER — CLOPIDOGREL BISULFATE 75 MG/1
75 TABLET ORAL DAILY
Qty: 30 TABLET | Refills: 12 | Status: SHIPPED | OUTPATIENT
Start: 2020-03-02 | End: 2021-03-19 | Stop reason: SDUPTHER

## 2020-03-02 RX ORDER — SPIRONOLACTONE AND HYDROCHLOROTHIAZIDE 25; 25 MG/1; MG/1
0.5 TABLET ORAL DAILY
Qty: 30 TABLET | Refills: 12 | Status: SHIPPED | OUTPATIENT
Start: 2020-03-02 | End: 2020-04-27 | Stop reason: SDUPTHER

## 2020-03-02 RX ORDER — ATORVASTATIN CALCIUM 20 MG/1
20 TABLET, FILM COATED ORAL DAILY
Qty: 30 TABLET | Refills: 12 | Status: SHIPPED | OUTPATIENT
Start: 2020-03-02 | End: 2021-02-26

## 2020-03-02 RX ORDER — RANOLAZINE 500 MG/1
500 TABLET, EXTENDED RELEASE ORAL 2 TIMES DAILY
Qty: 60 TABLET | Refills: 6 | Status: SHIPPED | OUTPATIENT
Start: 2020-03-02 | End: 2020-05-07

## 2020-03-02 RX ORDER — ISOSORBIDE MONONITRATE 60 MG/1
60 TABLET, EXTENDED RELEASE ORAL DAILY
Qty: 30 TABLET | Refills: 11 | Status: SHIPPED | OUTPATIENT
Start: 2020-03-02 | End: 2021-03-19 | Stop reason: SDUPTHER

## 2020-03-06 ENCOUNTER — LAB (OUTPATIENT)
Dept: LAB | Facility: HOSPITAL | Age: 55
End: 2020-03-06

## 2020-03-06 DIAGNOSIS — I20.9 ANGINA PECTORIS (HCC): ICD-10-CM

## 2020-03-06 PROCEDURE — 85018 HEMOGLOBIN: CPT

## 2020-03-06 PROCEDURE — 85014 HEMATOCRIT: CPT

## 2020-03-07 LAB
HCT VFR BLD AUTO: 34.5 % (ref 34–46.6)
HGB BLD-MCNC: 11.7 G/DL (ref 12–15.9)

## 2020-04-27 RX ORDER — SPIRONOLACTONE AND HYDROCHLOROTHIAZIDE 25; 25 MG/1; MG/1
0.5 TABLET ORAL DAILY
Qty: 30 TABLET | Refills: 12 | Status: SHIPPED | OUTPATIENT
Start: 2020-04-27 | End: 2021-05-18 | Stop reason: SDUPTHER

## 2020-05-07 ENCOUNTER — OFFICE VISIT (OUTPATIENT)
Dept: CARDIOLOGY | Facility: CLINIC | Age: 55
End: 2020-05-07

## 2020-05-07 VITALS — HEIGHT: 68 IN | WEIGHT: 253 LBS | BODY MASS INDEX: 38.34 KG/M2

## 2020-05-07 DIAGNOSIS — E78.00 PURE HYPERCHOLESTEROLEMIA: ICD-10-CM

## 2020-05-07 DIAGNOSIS — I10 ESSENTIAL HYPERTENSION: ICD-10-CM

## 2020-05-07 DIAGNOSIS — I25.708 CORONARY ARTERY DISEASE OF BYPASS GRAFT OF NATIVE HEART WITH STABLE ANGINA PECTORIS (HCC): Primary | ICD-10-CM

## 2020-05-07 PROCEDURE — 99442 PR PHYS/QHP TELEPHONE EVALUATION 11-20 MIN: CPT | Performed by: INTERNAL MEDICINE

## 2020-05-07 RX ORDER — RANOLAZINE 1000 MG/1
1000 TABLET, EXTENDED RELEASE ORAL 2 TIMES DAILY
Qty: 60 TABLET | Refills: 11 | Status: SHIPPED | OUTPATIENT
Start: 2020-05-07 | End: 2020-07-30 | Stop reason: SDUPTHER

## 2020-05-07 NOTE — PROGRESS NOTES
Sena Romero  55 y.o. female    2020  Chief Complaint   Patient presents with   • Follow-up     Patient consented to telephone visit.  History of Present Illness    Patient has known coronary disease.    -        SUBJECTIVE  This involved the  LIMA we felt with medical therapy be able to tolerate angina and not to try angioplasty.  We started her on Ranexa and since that time she said her severe pain is much better.  She can still have some angina and it takes more effort to cause her to start. The  Cramping pain has gone away.  She is doing much better she feels good.    No Known Allergies      Past Medical History:   Diagnosis Date   • Arthritis    • Chronic back pain     And leg, DJD      • Chronic depression 2014    With anxiety   • Chronic pain disorder    • COPD (chronic obstructive pulmonary disease) (CMS/[facility])    • Coronary arteriosclerosis 2014    3 vessel CABG in  then 1 stent in .  is followed by dr moncada   • Costal chondritis 2014   • Dyslipidemia    • Essential hypertension 2014   • GERD (gastroesophageal reflux disease)    • Glaucoma    • Hyperparathyroidism (CMS/[facility])    • Injury of back    • Low back pain    • Myocardial infarct (CMS/[facility])    • Obese 2014   • Pain of sternum 2014   • Primary hyperparathyroidism (CMS/HCC) 2017   • Pseudoaneurysm of femoral artery (CMS/[facility]) 2015    Post-catheterization femoral pseudoaneurysm     • Vitamin D deficiency 2017         Past Surgical History:   Procedure Laterality Date   • BACK SURGERY      multiple   • CARDIAC CATHETERIZATION  2015    Cardiac cath 44607 (2)      • CARDIAC CATHETERIZATION N/A 2020    Procedure: Left Heart Cath;  Surgeon: Zohreh Shaw MD;  Location: [facility] CATH INVASIVE LOCATION;  Service: Cardiology;  Laterality: N/A;   •  SECTION      x 2   • CHOLECYSTECTOMY      Cholecystectomy (1)      • CORONARY ARTERY BYPASS GRAFT  2014    CABG (1)       • EXTRACORPOREAL SHOCK WAVE LITHOTRIPSY (ESWL)      multiple   • HYSTERECTOMY      Anesth, hysterectomy (1)      • INJECTION OF MEDICATION  03/03/2015    Percu Tx of Extrem Pseudoaneurysm US guided 23929 (1)      • [place] EXPLORE PARATHYROID GLANDS Left 8/14/2017    Procedure: PARATHYROIDECTOMY;  Surgeon: Jason England MD;  Location: [facility];  Service: General   • TONSILLECTOMY           Family History   Problem Relation Age of Onset   • Diabetes Mother    • Hyperparathyroidism Mother    • Mental illness Father    • COPD Sister    • Osteoporosis Sister    • Hyperparathyroidism Sister    • Cancer Maternal Aunt    • Diabetes Maternal Aunt    • Diabetes Maternal Uncle    • Coronary artery disease Neg Hx          Social History     Socioeconomic History   • Marital status:      Spouse name: Not on file   • Number of children: Not on file   • Years of education: Not on file   • Highest education level: Not on file   Tobacco Use   • Smoking status: Former Smoker     Packs/day: 0.50     Types: Cigarettes     Last attempt to quit: 2017     Years since quitting: 3.3   • Smokeless tobacco: Never Used   Substance and Sexual Activity   • Alcohol use: No   • Drug use: No   • Sexual activity: Defer         Prior to Admission medications    Medication Sig Start Date End Date Taking? Authorizing Provider   ASPIRIN PO Take 81 mg by mouth Daily. 3/20/15  Yes Mookie Obrien MD   atorvastatin (LIPITOR) 20 MG tablet Take 1 tablet by mouth Daily. 3/2/20  Yes Natalie Rivas APRN   carvedilol (COREG) 12.5 MG tablet Take 12.5 mg by mouth 2 (Two) Times a Day With Meals.   Yes Mookie Obrien MD   clopidogrel (PLAVIX) 75 MG tablet Take 1 tablet by mouth Daily. 3/2/20  Yes Natalie Rivas APRN   famotidine (PEPCID) 20 MG tablet Take 20 mg by mouth 2 (Two) Times a Day.   Yes Mookie Obrien MD   gabapentin (NEURONTIN) 800 MG tablet Take 1 tablet by mouth 3 (Three) Times a Day As Needed. 1/19/20  Yes Micah  "MD Mookie   HYDROcodone-acetaminophen (NORCO)  MG per tablet Take 1 tablet by mouth 3 (Three) Times a Day As Needed. 1/24/20  Yes Mookie Obrien MD   isosorbide mononitrate (IMDUR) 60 MG 24 hr tablet Take 1 tablet by mouth Daily. 3/2/20  Yes Natalie Rivas APRN   lisinopril (PRINIVIL,ZESTRIL) 10 MG tablet Take 10 mg by mouth Daily.   Yes Mookie Obrien MD   metFORMIN (GLUCOPHAGE) 500 MG tablet Take 1 tablet by mouth 2 (Two) Times a Day With Meals. 2/29/20  Yes Natalie Rivas APRN   nitroglycerin (NITROSTAT) 0.4 MG SL tablet Place 0.4 mg under the tongue Every 5 (Five) Minutes As Needed for Chest Pain. Take no more than 3 doses in 15 minutes.   Yes Mookie Obrien MD   ranolazine (RANEXA) 1000 MG 12 hr tablet Take 1 tablet by mouth 2 (Two) Times a Day. 5/7/20  Yes Zohreh Shaw MD   spironolactone-hydrochlorothiazide (ALDACTAZIDE) 25-25 MG tablet Take 0.5 tablets by mouth Daily. 4/27/20  Yes Zohreh Shaw MD   Tiotropium Bromide Monohydrate (SPIRIVA HANDIHALER IN) Inhale 1 puff Daily As Needed. 3/20/15  Yes Mookie Obrien MD   tiZANidine (ZANAFLEX) 4 MG tablet Take 1 tablet by mouth 2 (Two) Times a Day As Needed.   Yes ProviderMookie MD   VENTOLIN  (90 BASE) MCG/ACT inhaler Inhale 2 puffs Every 4 (Four) Hours As Needed. 12/9/16  Yes Mookie Obrien MD   ranolazine (RANEXA) 500 MG 12 hr tablet Take 1 tablet by mouth 2 (Two) Times a Day. 3/2/20 5/7/20 Yes Natalie Rivas APRN         OBJECTIVE    Ht 172.7 cm (67.99\")   Wt 115 kg (253 lb)   BMI 38.48 kg/m²         Review of Systems     Constitutional:  Denies recent weight loss, weight gain, fever or chills, no change in exercise tolerance     HENT:  Denies any hearing loss, epistaxis, hoarseness, or difficulty speaking.     Eyes: Wears eyeglasses or contact lenses     Respiratory:  Denies dyspnea with exertion,no cough, wheezing, or hemoptysis.     Cardiovascular: Negative for " palpations, chest pain, orthopnea, PND, peripheral edema, syncope, or claudication.     Gastrointestinal:  Denies change in bowel habits, dyspepsia, ulcer disease, hematochezia, or melena.     Endocrine: Negative for cold intolerance, heat intolerance, polydipsia, polyphagia and polyuria. Denies any history of weight change, heat/cold intolerance, polydipsia, polyuria     Genitourinary: Negative.      Musculoskeletal: Denies any history of arthritic symptoms or back problems     Skin:  Denies any change in hair or nails, rashes, or skin lesions.     Allergic/Immunologic: Negative.  Negative for environmental allergies, food allergies and immunocompromised state.     Neurological:  Denies any history of recurrent headaches, strokes, TIA, or seizure disorder.     Hematological: Denies any food allergies, seasonal allergies, bleeding disorders, or lymphadenopathy.     Psychiatric/Behavioral: Denies any history of depression, substance abuse, or change in cognitive function.         Physical Exam     Constitutional: Cooperative, alert and oriented, well-developed, well-nourished, in no acute distress.     HENT:   Cardiovascular:     Pulmonary/Chest:            Pulmonary: Normal breath sounds. No rales or ronchi.    Abdominal:   Neurological: No gross  sensory deficits noted, affect appropriate, oriented to time, person, place.     Skin:  Psychiatric: She has a normal mood and affect. Her behavior is normal. Judgment and thought content normal.         Procedures      Lab Results   Component Value Date    WBC 8.27 02/27/2020    HGB 11.7 (L) 03/06/2020    HCT 34.5 03/06/2020    MCV 87.9 02/27/2020     02/27/2020     Lab Results   Component Value Date    GLUCOSE 127 (H) 02/27/2020    BUN 14 02/27/2020    CREATININE 0.98 02/27/2020    EGFRIFNONA 59 (L) 02/27/2020    BCR 14.3 02/27/2020    CO2 24.0 02/27/2020    CALCIUM 8.5 (L) 02/27/2020    ALBUMIN 4.30 02/26/2020    AST 19 02/26/2020    ALT 24 02/26/2020     Lab  Results   Component Value Date    CHOL 118 02/27/2020    CHOL 149 02/26/2020     Lab Results   Component Value Date    TRIG 99 02/27/2020    TRIG 116 02/26/2020    TRIG 184 03/03/2015     Lab Results   Component Value Date    HDL 40 02/27/2020    HDL 49 02/26/2020    HDL 33 (L) 03/03/2015     No components found for: LDLCALC  Lab Results   Component Value Date    LDL 58 02/27/2020    LDL 77 02/26/2020     03/03/2015     No results found for: HDLLDLRATIO  No components found for: CHOLHDL  Lab Results   Component Value Date    HGBA1C 7.10 (H) 02/26/2020     Lab Results   Component Value Date    TSH 1.100 06/26/2017           ASSESSMENT AND PLAN      Sena was seen today for follow-up via telephone.    Diagnoses and all orders for this visit:    Coronary artery disease of bypass graft of native heart with stable angina pectoris (CMS/[facility])  This is improved from Ranexa was started.  We will increase that to thousand milligrams twice daily.  Essential hypertension  This is stable.  Pure hypercholesterolemia  This was under good control.  Other orders  -     ranolazine (RANEXA) 1000 MG 12 hr tablet; Take 1 tablet by mouth 2 (Two) Times a Day.        Patient's Body mass index is 38.48 kg/m². BMI is above normal parameters. Recommendations include: educational material.      This telephone visit 11 to 20 minutes.          Zohreh Shaw MD  5/7/2020  12:17

## 2020-07-30 ENCOUNTER — TELEPHONE (OUTPATIENT)
Dept: CARDIOLOGY | Facility: CLINIC | Age: 55
End: 2020-07-30

## 2020-07-30 RX ORDER — RANOLAZINE 500 MG/1
TABLET, EXTENDED RELEASE ORAL
Qty: 180 TABLET | Refills: 5 | Status: SHIPPED | OUTPATIENT
Start: 2020-07-30 | End: 2020-07-30 | Stop reason: DRUGHIGH

## 2020-07-30 RX ORDER — RANOLAZINE 1000 MG/1
1000 TABLET, EXTENDED RELEASE ORAL 2 TIMES DAILY
Qty: 60 TABLET | Refills: 11 | Status: SHIPPED | OUTPATIENT
Start: 2020-07-30 | End: 2021-08-12

## 2020-07-30 NOTE — TELEPHONE ENCOUNTER
Attempted to contact patient to discuss her ranexa, had to leave a vm but pt is to be taking 1000 mg 2x daily per herb last note

## 2020-07-31 ENCOUNTER — TELEPHONE (OUTPATIENT)
Dept: CARDIOLOGY | Facility: CLINIC | Age: 55
End: 2020-07-31

## 2020-07-31 NOTE — TELEPHONE ENCOUNTER
Pt returned my phone call from yesterday about her ranexa and I informed her that she was correct, she is supposed to be taken 1000 mg 2x daily, and that the pharmacy had made a mistake. I also informed her that me and Natalie did send in a new request (same one that Dr. Shaw sent in may) and spoke with the pharmacy to make sure that they received it and that they had it in to give to the patient. Pt voiced her understanding and was thankful that we got it taken care of.

## 2020-09-08 RX ORDER — LISINOPRIL 10 MG/1
TABLET ORAL
Qty: 30 TABLET | Refills: 5 | Status: SHIPPED | OUTPATIENT
Start: 2020-09-08 | End: 2021-05-12 | Stop reason: SDUPTHER

## 2021-01-12 RX ORDER — CARVEDILOL 12.5 MG/1
12.5 TABLET ORAL 2 TIMES DAILY WITH MEALS
Qty: 60 TABLET | Refills: 6 | Status: SHIPPED | OUTPATIENT
Start: 2021-01-12 | End: 2021-08-23 | Stop reason: SDUPTHER

## 2021-02-26 RX ORDER — ATORVASTATIN CALCIUM 20 MG/1
TABLET, FILM COATED ORAL
Qty: 90 TABLET | Refills: 3 | Status: SHIPPED | OUTPATIENT
Start: 2021-02-26 | End: 2022-03-29

## 2021-03-01 RX ORDER — ISOSORBIDE MONONITRATE 60 MG/1
TABLET, EXTENDED RELEASE ORAL
Qty: 30 TABLET | Refills: 10 | OUTPATIENT
Start: 2021-03-01

## 2021-03-11 RX ORDER — ISOSORBIDE MONONITRATE 60 MG/1
TABLET, EXTENDED RELEASE ORAL
Qty: 30 TABLET | Refills: 10 | OUTPATIENT
Start: 2021-03-11

## 2021-03-15 RX ORDER — CLOPIDOGREL BISULFATE 75 MG/1
TABLET ORAL
Qty: 90 TABLET | Refills: 11 | OUTPATIENT
Start: 2021-03-15

## 2021-03-15 RX ORDER — ISOSORBIDE MONONITRATE 60 MG/1
TABLET, EXTENDED RELEASE ORAL
Qty: 30 TABLET | Refills: 10 | OUTPATIENT
Start: 2021-03-15

## 2021-03-22 RX ORDER — ISOSORBIDE MONONITRATE 60 MG/1
60 TABLET, EXTENDED RELEASE ORAL DAILY
Qty: 30 TABLET | Refills: 5 | Status: SHIPPED | OUTPATIENT
Start: 2021-03-22 | End: 2021-09-21

## 2021-03-22 RX ORDER — CLOPIDOGREL BISULFATE 75 MG/1
75 TABLET ORAL DAILY
Qty: 30 TABLET | Refills: 5 | Status: SHIPPED | OUTPATIENT
Start: 2021-03-22 | End: 2021-09-21

## 2021-05-12 ENCOUNTER — TELEPHONE (OUTPATIENT)
Dept: CARDIOLOGY | Facility: CLINIC | Age: 56
End: 2021-05-12

## 2021-05-12 DIAGNOSIS — I10 ESSENTIAL HYPERTENSION: Primary | ICD-10-CM

## 2021-05-12 RX ORDER — LISINOPRIL 10 MG/1
10 TABLET ORAL DAILY
Qty: 30 TABLET | Refills: 1 | Status: SHIPPED | OUTPATIENT
Start: 2021-05-12 | End: 2021-05-14 | Stop reason: SDUPTHER

## 2021-05-12 NOTE — TELEPHONE ENCOUNTER
Per Dr. Shaw, I spoke to Ms. Heather and explained that Dr. Shaw wanted a BMP lab test.  Orders have been placed and patient stated she will stop at the [facility] to have labs drawn.        ----- Message from Zohreh Shaw MD sent at 5/12/2021  1:45 PM CDT -----  The lisinopril you sent me said she hasnt had labs oover a year.  She needs a BMP

## 2021-05-13 ENCOUNTER — LAB (OUTPATIENT)
Dept: LAB | Facility: HOSPITAL | Age: 56
End: 2021-05-13

## 2021-05-13 DIAGNOSIS — I10 ESSENTIAL HYPERTENSION: ICD-10-CM

## 2021-05-13 LAB
ANION GAP SERPL CALCULATED.3IONS-SCNC: 13.9 MMOL/L (ref 5–15)
BUN SERPL-MCNC: 20 MG/DL (ref 6–20)
BUN/CREAT SERPL: 19 (ref 7–25)
CALCIUM SPEC-SCNC: 9 MG/DL (ref 8.6–10.5)
CHLORIDE SERPL-SCNC: 103 MMOL/L (ref 98–107)
CO2 SERPL-SCNC: 22.1 MMOL/L (ref 22–29)
CREAT SERPL-MCNC: 1.05 MG/DL (ref 0.57–1)
GFR SERPL CREATININE-BSD FRML MDRD: 54 ML/MIN/1.73
GLUCOSE SERPL-MCNC: 294 MG/DL (ref 65–99)
POTASSIUM SERPL-SCNC: 4.3 MMOL/L (ref 3.5–5.2)
SODIUM SERPL-SCNC: 139 MMOL/L (ref 136–145)

## 2021-05-13 PROCEDURE — 80048 BASIC METABOLIC PNL TOTAL CA: CPT

## 2021-05-14 ENCOUNTER — TELEPHONE (OUTPATIENT)
Dept: CARDIOLOGY | Facility: CLINIC | Age: 56
End: 2021-05-14

## 2021-05-14 RX ORDER — LISINOPRIL 10 MG/1
10 TABLET ORAL DAILY
Qty: 30 TABLET | Refills: 5 | Status: SHIPPED | OUTPATIENT
Start: 2021-05-14 | End: 2022-02-14

## 2021-05-18 RX ORDER — SPIRONOLACTONE AND HYDROCHLOROTHIAZIDE 25; 25 MG/1; MG/1
TABLET ORAL
Qty: 30 TABLET | Refills: 1 | Status: SHIPPED | OUTPATIENT
Start: 2021-05-18 | End: 2021-09-20

## 2021-08-05 ENCOUNTER — OFFICE VISIT (OUTPATIENT)
Dept: CARDIOLOGY | Facility: CLINIC | Age: 56
End: 2021-08-05

## 2021-08-05 VITALS
BODY MASS INDEX: 44.39 KG/M2 | OXYGEN SATURATION: 99 % | WEIGHT: 260 LBS | DIASTOLIC BLOOD PRESSURE: 74 MMHG | HEART RATE: 55 BPM | SYSTOLIC BLOOD PRESSURE: 110 MMHG | TEMPERATURE: 97.9 F | HEIGHT: 64 IN

## 2021-08-05 DIAGNOSIS — I10 ESSENTIAL HYPERTENSION: ICD-10-CM

## 2021-08-05 DIAGNOSIS — Z01.810 PREOPERATIVE CARDIOVASCULAR EXAMINATION: Primary | ICD-10-CM

## 2021-08-05 DIAGNOSIS — E78.2 MIXED HYPERLIPIDEMIA: ICD-10-CM

## 2021-08-05 DIAGNOSIS — I25.708 CORONARY ARTERY DISEASE OF BYPASS GRAFT OF NATIVE HEART WITH STABLE ANGINA PECTORIS (HCC): ICD-10-CM

## 2021-08-05 PROCEDURE — 99213 OFFICE O/P EST LOW 20 MIN: CPT | Performed by: INTERNAL MEDICINE

## 2021-08-05 PROCEDURE — 93000 ELECTROCARDIOGRAM COMPLETE: CPT | Performed by: INTERNAL MEDICINE

## 2021-08-05 RX ORDER — AMMONIUM LACTATE 12 G/100G
CREAM TOPICAL
COMMUNITY

## 2021-08-05 NOTE — PROGRESS NOTES
Sena Romero  56 y.o. female    2021  Chief Complaint   Patient presents with   • Coronary Artery Disease   • pre op eval       History of Present Illness    History of coronary bypass grafting surgery  -        SUBJECTIVE  Patient is doing well.  She has a rare angina and episode that is stable.  She has no shortness of air.  She is status post coronary bypass grafting surgery followed by a stent.  2 of her vein grafts are still patent though they are smaller and smaller vessels.  Her LIMA is patent but she does have a dynamic.  He has no new symptoms.  No Known Allergies      Past Medical History:   Diagnosis Date   • Arthritis    • Chronic back pain     And leg, DJD      • Chronic depression 2014    With anxiety   • Chronic pain disorder    • COPD (chronic obstructive pulmonary disease) (CMS/[facility])    • Coronary arteriosclerosis 2014    3 vessel CABG in  then 1 stent in .  is followed by dr moncada   • Costal chondritis 2014   • Dyslipidemia    • Essential hypertension 2014   • GERD (gastroesophageal reflux disease)    • Glaucoma    • Hyperparathyroidism (CMS/[facility])    • Injury of back    • Low back pain    • Myocardial infarct (CMS/[facility])    • Obese 2014   • Pain of sternum 2014   • Primary hyperparathyroidism (CMS/HCC) 2017   • Pseudoaneurysm of femoral artery (CMS/[facility]) 2015    Post-catheterization femoral pseudoaneurysm     • Vitamin D deficiency 2017         Past Surgical History:   Procedure Laterality Date   • BACK SURGERY      multiple   • CARDIAC CATHETERIZATION  2015    Cardiac cath 03531 (2)      • CARDIAC CATHETERIZATION N/A 2020    Procedure: Left Heart Cath;  Surgeon: Zohreh Shaw MD;  Location: [facility] CATH INVASIVE LOCATION;  Service: Cardiology;  Laterality: N/A;   •  SECTION      x 2   • CHOLECYSTECTOMY      Cholecystectomy (1)      • CORONARY ARTERY BYPASS GRAFT  2014    CABG (1)      •  EXTRACORPOREAL SHOCK WAVE LITHOTRIPSY (ESWL)      multiple   • HYSTERECTOMY      Anesth, hysterectomy (1)      • INJECTION OF MEDICATION  2015    Percu Tx of Extrem Pseudoaneurysm US guided 58215 (1)      • VT EXPLORE PARATHYROID GLANDS Left 2017    Procedure: PARATHYROIDECTOMY;  Surgeon: Jason England MD;  Location: [facility];  Service: General   • TONSILLECTOMY           Family History   Problem Relation Age of Onset   • Diabetes Mother    • Hyperparathyroidism Mother    • Mental illness Father    • COPD Sister    • Osteoporosis Sister    • Hyperparathyroidism Sister    • Cancer Maternal Aunt    • Diabetes Maternal Aunt    • Diabetes Maternal Uncle    • Coronary artery disease Neg Hx          Social History     Socioeconomic History   • Marital status:      Spouse name: Not on file   • Number of children: Not on file   • Years of education: Not on file   • Highest education level: Not on file   Tobacco Use   • Smoking status: Former Smoker     Packs/day: 0.50     Types: Cigarettes     Quit date:      Years since quittin.5   • Smokeless tobacco: Never Used   Substance and Sexual Activity   • Alcohol use: No   • Drug use: No   • Sexual activity: Defer         Prior to Admission medications    Medication Sig Start Date End Date Taking? Authorizing Provider   ammonium lactate (AMLACTIN) 12 % cream ammonium lactate 12 % topical cream   Yes ProviderMookie MD   ASPIRIN PO Take 81 mg by mouth Daily. 3/20/15  Yes Mookie Obrien MD   atorvastatin (LIPITOR) 20 MG tablet TAKE ONE TABLET BY MOUTH DAILY 21  Yes Zohreh Shaw MD   carvedilol (COREG) 12.5 MG tablet Take 1 tablet by mouth 2 (Two) Times a Day With Meals. 21  Yes Zohreh Shaw MD   clopidogrel (PLAVIX) 75 MG tablet Take 1 tablet by mouth Daily. 3/22/21  Yes Zohreh Shaw MD   famotidine (PEPCID) 20 MG tablet Take 20 mg by mouth 2 (Two) Times a Day.   Yes Mookie Obrien MD   gabapentin  "(NEURONTIN) 800 MG tablet Take 1 tablet by mouth 3 (Three) Times a Day As Needed. 1/19/20  Yes Mookie Obrien MD   HYDROcodone-acetaminophen (NORCO)  MG per tablet Take 1 tablet by mouth 3 (Three) Times a Day As Needed. 1/24/20  Yes Mookie Obrien MD   isosorbide mononitrate (IMDUR) 60 MG 24 hr tablet Take 1 tablet by mouth Daily. 3/22/21  Yes Zohreh Shaw MD   lisinopril (PRINIVIL,ZESTRIL) 10 MG tablet Take 1 tablet by mouth Daily. 5/14/21  Yes Zohreh Shaw MD   metFORMIN (GLUCOPHAGE) 500 MG tablet TAKE ONE TABLET BY MOUTH TWICE A DAY WITH MEALS 4/16/21  Yes Zohreh Shaw MD   nitroglycerin (NITROSTAT) 0.4 MG SL tablet Place 0.4 mg under the tongue Every 5 (Five) Minutes As Needed for Chest Pain. Take no more than 3 doses in 15 minutes.   Yes Mookie Obrien MD   ranolazine (RANEXA) 1000 MG 12 hr tablet Take 1 tablet by mouth 2 (Two) Times a Day. 7/30/20  Yes Zohreh Shaw MD   spironolactone-hydrochlorothiazide (ALDACTAZIDE) 25-25 MG tablet TAKE ONE-HALF TABLET BY MOUTH DAILY 5/18/21  Yes Zohreh Shaw MD   Tiotropium Bromide Monohydrate (SPIRIVA HANDIHALER IN) Inhale 1 puff Daily As Needed. 3/20/15  Yes Mookie Obrien MD   tiZANidine (ZANAFLEX) 4 MG tablet Take 1 tablet by mouth 2 (Two) Times a Day As Needed.   Yes Mookie Obrien MD   VENTOLIN  (90 BASE) MCG/ACT inhaler Inhale 2 puffs Every 4 (Four) Hours As Needed. 12/9/16  Yes Mookie Obrien MD   VITAMIN D PO Take  by mouth.   Yes Mookie Obrien MD         OBJECTIVE    /74 (BP Location: Left arm, Patient Position: Sitting, Cuff Size: Adult)   Pulse 55   Temp 97.9 °F (36.6 °C)   Ht 162.6 cm (64\")   Wt 118 kg (260 lb)   SpO2 99%   BMI 44.63 kg/m²         Review of Systems     Constitutional:  Denies recent weight loss, weight gain, fever or chills, no change in exercise tolerance     HENT:  Denies any hearing loss, epistaxis, hoarseness, or difficulty " speaking.     Eyes: Wears eyeglasses or contact lenses     Respiratory:  Denies dyspnea with exertion,no cough, wheezing, or hemoptysis.     Cardiovascular: Negative for palpations, chest pain, orthopnea, PND, peripheral edema, syncope, or claudication.     Gastrointestinal:  Denies change in bowel habits, dyspepsia, ulcer disease, hematochezia, or melena.     Endocrine: Negative for cold intolerance, heat intolerance, polydipsia, polyphagia and polyuria. Denies any history of weight change, heat/cold intolerance, polydipsia, polyuria     Genitourinary: Negative.      Musculoskeletal: Denies any history of arthritic symptoms or back problems     Skin:  Denies any change in hair or nails, rashes, or skin lesions.     Allergic/Immunologic: Negative.  Negative for environmental allergies, food allergies and immunocompromised state.     Neurological:  Denies any history of recurrent headaches, strokes, TIA, or seizure disorder.     Hematological: Denies any food allergies, seasonal allergies, bleeding disorders, or lymphadenopathy.     Psychiatric/Behavioral: Denies any history of depression, substance abuse, or change in cognitive function.         Physical Exam     Constitutional: Cooperative, alert and oriented, well-developed, well-nourished, in no acute distress.     HENT:   Head: Normocephalic, normal hair patterns, no masses or tenderness.  Ears, Nose, and Throat: No gross abnormalities. No pallor or cyanosis. Dentition good.   Eyes: EOMS intact, PERRL, conjunctivae and lids unremarkable. Fundoscopic exam and visual fields not performed.   Neck: No palpable masses or adenopathy, no thyromegaly, no JVD, carotid pulses are full and equal bilaterally and without  Bruits.     Cardiovascular: Regular rhythm, S1 and S2 normal, no S3 or S4. Apical impulse not displaced. No murmurs, gallops, or rubs detected.     Pulmonary/Chest: Chest: normal symmetry, no tenderness to palpation, normal respiratory excursion, no  intercostal retraction, no use of accessory muscles.            Pulmonary: Normal breath sounds. No rales or ronchi.    Abdominal: Abdomen soft, bowel sounds normoactive, no masses, no hepatosplenomegaly, non-tender, no bruits.     Musculoskeletal: No deformities, clubbing, cyanosis, erythema, or edema observed. There are no spinal abnormalities noted. Normal muscle strength and tone. Pulses full and equal in all extremities, no bruits auscultated.     Neurological: No gross motor or sensory deficits noted, affect appropriate, oriented to time, person, place.     Skin: Warm and dry to the touch, no apparent skin lesions or masses noted.     Psychiatric: She has a normal mood and affect. Her behavior is normal. Judgment and thought content normal.         Procedures      Lab Results   Component Value Date    WBC 8.27 02/27/2020    HGB 11.7 (L) 03/06/2020    HCT 34.5 03/06/2020    MCV 87.9 02/27/2020     02/27/2020     Lab Results   Component Value Date    GLUCOSE 294 (H) 05/13/2021    BUN 20 05/13/2021    CREATININE 1.05 (H) 05/13/2021    EGFRIFNONA 54 (L) 05/13/2021    BCR 19.0 05/13/2021    CO2 22.1 05/13/2021    CALCIUM 9.0 05/13/2021    ALBUMIN 4.30 02/26/2020    AST 19 02/26/2020    ALT 24 02/26/2020     Lab Results   Component Value Date    CHOL 118 02/27/2020    CHOL 149 02/26/2020     Lab Results   Component Value Date    TRIG 99 02/27/2020    TRIG 116 02/26/2020    TRIG 184 03/03/2015     Lab Results   Component Value Date    HDL 40 02/27/2020    HDL 49 02/26/2020    HDL 33 (L) 03/03/2015     No components found for: LDLCALC  Lab Results   Component Value Date    LDL 58 02/27/2020    LDL 77 02/26/2020     03/03/2015     No results found for: HDLLDLRATIO  No components found for: CHOLHDL  Lab Results   Component Value Date    HGBA1C 7.10 (H) 02/26/2020     Lab Results   Component Value Date    TSH 1.100 06/26/2017           ASSESSMENT AND PLAN      Diagnoses and all orders for this visit:    1.  Preoperative cardiovascular examination (Primary)  -     ECG 12 Lead    2. Coronary artery disease of bypass graft of native heart with stable angina pectoris (CMS/HCC)    3. Essential hypertension    4. Mixed hyperlipidemia    She is here for preoperative examination.  She has coronary bypass grafting surgery.  Her symptoms are stable.  Her ECG shows sinus bradycardia but otherwise no acute changes.    At this time she is at think the best she can be to undergo her back surgery.  I think she is at average risk for someone with history of coronary disease post bypass.  Obviously his cardiologist we would rather her dual antiplatelet therapy not be stopped however we do realize that his probably does need and if told her that this is actually probably the higher risk of any person undergoing surgery is the cessation of their antiplatelet therapy in stable condition.  With that she can proceed on with the surgery on her current antianginal medicines.  She should hold her Plavix no longer than 5 days and her aspirin 7 days to decrease the bleeding risk and rebegin them as soon as possible afterwards.    Patient's Body mass index is 44.63 kg/m². indicating that she is morbidly obese (BMI > 40 or > 35 with obesity - related health condition). Obesity-related health conditions include the following: coronary heart disease. Obesity is unchanged. BMI is is above average; BMI management plan is completed. We discussed portion control and increasing exercise..                Zohreh Shaw MD  8/5/2021  09:48 CDT

## 2021-08-06 LAB
QT INTERVAL: 448 MS
QTC INTERVAL: 428 MS

## 2021-08-12 RX ORDER — RANOLAZINE 1000 MG/1
TABLET, EXTENDED RELEASE ORAL
Qty: 180 TABLET | Refills: 1 | Status: SHIPPED | OUTPATIENT
Start: 2021-08-12 | End: 2022-03-14

## 2021-08-23 RX ORDER — CARVEDILOL 12.5 MG/1
12.5 TABLET ORAL 2 TIMES DAILY WITH MEALS
Qty: 60 TABLET | Refills: 6 | Status: SHIPPED | OUTPATIENT
Start: 2021-08-23 | End: 2022-05-16 | Stop reason: ALTCHOICE

## 2021-09-20 RX ORDER — SPIRONOLACTONE AND HYDROCHLOROTHIAZIDE 25; 25 MG/1; MG/1
TABLET ORAL
Qty: 30 TABLET | Refills: 3 | Status: SHIPPED | OUTPATIENT
Start: 2021-09-20 | End: 2022-05-16 | Stop reason: SDUPTHER

## 2021-09-21 RX ORDER — CLOPIDOGREL BISULFATE 75 MG/1
TABLET ORAL
Qty: 30 TABLET | Refills: 5 | Status: SHIPPED | OUTPATIENT
Start: 2021-09-21 | End: 2022-04-21

## 2021-09-21 RX ORDER — ISOSORBIDE MONONITRATE 60 MG/1
TABLET, EXTENDED RELEASE ORAL
Qty: 30 TABLET | Refills: 5 | Status: SHIPPED | OUTPATIENT
Start: 2021-09-21 | End: 2022-05-06

## 2021-10-01 NOTE — PROGRESS NOTES
Discharge Summary  Discharge Summary from Physical Therapy Report      Dates  PT visit: 11-  Number of Visits: 3/7     Discharge Status of Patient: pt did not return    Goals: Not Met    Discharge Plan: pt did not return    Comments: pt did not return    Date of Discharge: 01-        Felipa Rosales, PTA  Physical Therapist Assistant                      
[phone]

## 2021-10-13 ENCOUNTER — TELEPHONE (OUTPATIENT)
Dept: CARDIOLOGY | Facility: CLINIC | Age: 56
End: 2021-10-13

## 2021-10-13 NOTE — TELEPHONE ENCOUNTER
Contacted PT per Dr. Shaw about refills metformin 500mg.  Discussed with pt about how pt will need to see Dr. Shaw before another refill can be sent in or pt would need to talk to PCP about it.  PT voiced understanding.

## 2022-02-14 RX ORDER — LISINOPRIL 10 MG/1
TABLET ORAL
Qty: 30 TABLET | Refills: 5 | Status: SHIPPED | OUTPATIENT
Start: 2022-02-14 | End: 2022-05-16 | Stop reason: SDUPTHER

## 2022-03-14 RX ORDER — RANOLAZINE 1000 MG/1
TABLET, EXTENDED RELEASE ORAL
Qty: 180 TABLET | Refills: 1 | Status: SHIPPED | OUTPATIENT
Start: 2022-03-14 | End: 2022-05-16 | Stop reason: SDUPTHER

## 2022-03-29 RX ORDER — ATORVASTATIN CALCIUM 20 MG/1
TABLET, FILM COATED ORAL
Qty: 30 TABLET | Refills: 0 | Status: SHIPPED | OUTPATIENT
Start: 2022-03-29 | End: 2022-05-06

## 2022-04-21 RX ORDER — CLOPIDOGREL BISULFATE 75 MG/1
TABLET ORAL
Qty: 30 TABLET | Refills: 5 | Status: SHIPPED | OUTPATIENT
Start: 2022-04-21 | End: 2022-05-16 | Stop reason: SDUPTHER

## 2022-05-06 RX ORDER — ATORVASTATIN CALCIUM 20 MG/1
TABLET, FILM COATED ORAL
Qty: 30 TABLET | Refills: 0 | Status: SHIPPED | OUTPATIENT
Start: 2022-05-06 | End: 2022-05-17 | Stop reason: SDUPTHER

## 2022-05-06 RX ORDER — ISOSORBIDE MONONITRATE 60 MG/1
TABLET, EXTENDED RELEASE ORAL
Qty: 30 TABLET | Refills: 0 | Status: SHIPPED | OUTPATIENT
Start: 2022-05-06 | End: 2022-05-16 | Stop reason: SDUPTHER

## 2022-05-16 ENCOUNTER — OFFICE VISIT (OUTPATIENT)
Dept: CARDIOLOGY | Facility: CLINIC | Age: 57
End: 2022-05-16

## 2022-05-16 ENCOUNTER — LAB (OUTPATIENT)
Dept: LAB | Facility: HOSPITAL | Age: 57
End: 2022-05-16

## 2022-05-16 ENCOUNTER — TELEPHONE (OUTPATIENT)
Dept: CARDIOLOGY | Facility: CLINIC | Age: 57
End: 2022-05-16

## 2022-05-16 VITALS
HEART RATE: 79 BPM | WEIGHT: 238.4 LBS | TEMPERATURE: 97.5 F | SYSTOLIC BLOOD PRESSURE: 158 MMHG | BODY MASS INDEX: 40.7 KG/M2 | DIASTOLIC BLOOD PRESSURE: 84 MMHG | HEIGHT: 64 IN | OXYGEN SATURATION: 98 %

## 2022-05-16 DIAGNOSIS — E78.2 MIXED HYPERLIPIDEMIA: ICD-10-CM

## 2022-05-16 DIAGNOSIS — E11.9 DIABETES MELLITUS, NEW ONSET: ICD-10-CM

## 2022-05-16 DIAGNOSIS — I25.708 CORONARY ARTERY DISEASE OF BYPASS GRAFT OF NATIVE HEART WITH STABLE ANGINA PECTORIS: Primary | ICD-10-CM

## 2022-05-16 LAB
ALBUMIN SERPL-MCNC: 4.5 G/DL (ref 3.5–5.2)
ALBUMIN/GLOB SERPL: 1.6 G/DL
ALP SERPL-CCNC: 66 U/L (ref 39–117)
ALT SERPL W P-5'-P-CCNC: 21 U/L (ref 1–33)
ANION GAP SERPL CALCULATED.3IONS-SCNC: 13.4 MMOL/L (ref 5–15)
AST SERPL-CCNC: 22 U/L (ref 1–32)
BILIRUB SERPL-MCNC: 0.4 MG/DL (ref 0–1.2)
BUN SERPL-MCNC: 19 MG/DL (ref 6–20)
BUN/CREAT SERPL: 17.4 (ref 7–25)
CALCIUM SPEC-SCNC: 9.2 MG/DL (ref 8.6–10.5)
CHLORIDE SERPL-SCNC: 103 MMOL/L (ref 98–107)
CHOLEST SERPL-MCNC: 147 MG/DL (ref 0–200)
CO2 SERPL-SCNC: 22.6 MMOL/L (ref 22–29)
CREAT SERPL-MCNC: 1.09 MG/DL (ref 0.57–1)
EGFRCR SERPLBLD CKD-EPI 2021: 59.4 ML/MIN/1.73
GLOBULIN UR ELPH-MCNC: 2.8 GM/DL
GLUCOSE SERPL-MCNC: 105 MG/DL (ref 65–99)
HBA1C MFR BLD: 5.7 % (ref 4.8–5.6)
HDLC SERPL-MCNC: 52 MG/DL (ref 40–60)
LDLC SERPL CALC-MCNC: 67 MG/DL (ref 0–100)
LDLC/HDLC SERPL: 1.18 {RATIO}
POTASSIUM SERPL-SCNC: 4.3 MMOL/L (ref 3.5–5.2)
PROT SERPL-MCNC: 7.3 G/DL (ref 6–8.5)
SODIUM SERPL-SCNC: 139 MMOL/L (ref 136–145)
TRIGL SERPL-MCNC: 167 MG/DL (ref 0–150)
VLDLC SERPL-MCNC: 28 MG/DL (ref 5–40)

## 2022-05-16 PROCEDURE — 36415 COLL VENOUS BLD VENIPUNCTURE: CPT | Performed by: INTERNAL MEDICINE

## 2022-05-16 PROCEDURE — 80061 LIPID PANEL: CPT | Performed by: INTERNAL MEDICINE

## 2022-05-16 PROCEDURE — 80053 COMPREHEN METABOLIC PANEL: CPT | Performed by: INTERNAL MEDICINE

## 2022-05-16 PROCEDURE — 83036 HEMOGLOBIN GLYCOSYLATED A1C: CPT | Performed by: INTERNAL MEDICINE

## 2022-05-16 PROCEDURE — 99214 OFFICE O/P EST MOD 30 MIN: CPT | Performed by: INTERNAL MEDICINE

## 2022-05-16 RX ORDER — METOPROLOL SUCCINATE 50 MG/1
50 TABLET, EXTENDED RELEASE ORAL DAILY
Qty: 30 TABLET | Refills: 11 | Status: SHIPPED | OUTPATIENT
Start: 2022-05-16

## 2022-05-16 RX ORDER — CLOPIDOGREL BISULFATE 75 MG/1
75 TABLET ORAL DAILY
Qty: 30 TABLET | Refills: 11 | Status: SHIPPED | OUTPATIENT
Start: 2022-05-16

## 2022-05-16 RX ORDER — ISOSORBIDE MONONITRATE 60 MG/1
60 TABLET, EXTENDED RELEASE ORAL DAILY
Qty: 30 TABLET | Refills: 11 | Status: SHIPPED | OUTPATIENT
Start: 2022-05-16 | End: 2022-06-15

## 2022-05-16 RX ORDER — SPIRONOLACTONE AND HYDROCHLOROTHIAZIDE 25; 25 MG/1; MG/1
0.5 TABLET ORAL DAILY
Qty: 30 TABLET | Refills: 6 | Status: SHIPPED | OUTPATIENT
Start: 2022-05-16

## 2022-05-16 RX ORDER — NITROGLYCERIN 0.4 MG/1
0.4 TABLET SUBLINGUAL
Qty: 100 TABLET | Refills: 1 | Status: SHIPPED | OUTPATIENT
Start: 2022-05-16

## 2022-05-16 RX ORDER — RANOLAZINE 1000 MG/1
1 TABLET, EXTENDED RELEASE ORAL 2 TIMES DAILY
Qty: 180 TABLET | Refills: 11 | Status: SHIPPED | OUTPATIENT
Start: 2022-05-16

## 2022-05-16 RX ORDER — BLOOD SUGAR DIAGNOSTIC
STRIP MISCELLANEOUS
COMMUNITY
Start: 2022-02-24

## 2022-05-16 RX ORDER — LISINOPRIL 10 MG/1
10 TABLET ORAL DAILY
Qty: 30 TABLET | Refills: 11 | Status: SHIPPED | OUTPATIENT
Start: 2022-05-16

## 2022-05-16 NOTE — PROGRESS NOTES
Sena Romero  57 y.o. female    2022  Chief Complaint   Patient presents with   •  Status post CABG       History of Present Illness    Patient is status post CABG  -        SUBJECTIVE  Patient is status post CABG.  She is doing well.  She was here actually because she needed her medicines refilled.  She has had no increase or angina.  She has had no shortness of air.  She is breathing well.  She has no other symptoms.  No Known Allergies      Past Medical History:   Diagnosis Date   • Arthritis    • Chronic back pain     And leg, DJD      • Chronic depression 2014    With anxiety   • Chronic pain disorder    • COPD (chronic obstructive pulmonary disease) ([facility])    • Coronary arteriosclerosis 2014    3 vessel CABG in  then 1 stent in .  is followed by dr moncada   • Costal chondritis 2014   • Dyslipidemia    • Essential hypertension 2014   • GERD (gastroesophageal reflux disease)    • Glaucoma    • Hyperparathyroidism (HCC)    • Injury of back    • Low back pain    • Myocardial infarct ([facility])    • Obese 2014   • Pain of sternum 2014   • Primary hyperparathyroidism ([facility]) 2017   • Pseudoaneurysm of femoral artery ([facility]) 2015    Post-catheterization femoral pseudoaneurysm     • Vitamin D deficiency 2017         Past Surgical History:   Procedure Laterality Date   • BACK SURGERY      multiple   • CARDIAC CATHETERIZATION  2015    Cardiac cath 35702 (2)      • CARDIAC CATHETERIZATION N/A 2020    Procedure: Left Heart Cath;  Surgeon: Zohreh Shaw MD;  Location: [facility] CATH INVASIVE LOCATION;  Service: Cardiology;  Laterality: N/A;   •  SECTION      x 2   • CHOLECYSTECTOMY      Cholecystectomy (1)      • CORONARY ARTERY BYPASS GRAFT  2014    CABG (1)      • EXTRACORPOREAL SHOCK WAVE LITHOTRIPSY (ESWL)      multiple   • HYSTERECTOMY      Anesth, hysterectomy (1)      • INJECTION OF MEDICATION  2015    Percu Tx of Extrem  Pseudoaneurysm US guided 17396 (1)      • OR EXPLORE PARATHYROID GLANDS Left 2017    Procedure: PARATHYROIDECTOMY;  Surgeon: Jason England MD;  Location: [facility];  Service: General   • TONSILLECTOMY           Family History   Problem Relation Age of Onset   • Diabetes Mother    • Hyperparathyroidism Mother    • Mental illness Father    • COPD Sister    • Osteoporosis Sister    • Hyperparathyroidism Sister    • Cancer Maternal Aunt    • Diabetes Maternal Aunt    • Diabetes Maternal Uncle    • Coronary artery disease Neg Hx          Social History     Socioeconomic History   • Marital status:    Tobacco Use   • Smoking status: Former Smoker     Packs/day: 0.50     Types: Cigarettes     Quit date:      Years since quittin.3   • Smokeless tobacco: Never Used   Substance and Sexual Activity   • Alcohol use: No   • Drug use: No   • Sexual activity: Defer         Prior to Admission medications    Medication Sig Start Date End Date Taking? Authorizing Provider   Accu-Chek Guide test strip  22  Yes Mookie Obrien MD   ammonium lactate (AMLACTIN) 12 % cream ammonium lactate 12 % topical cream   Yes Mookie Obrien MD   ASPIRIN PO Take 81 mg by mouth Daily. 3/20/15  Yes Mookie Obrien MD   atorvastatin (LIPITOR) 20 MG tablet TAKE ONE TABLET BY MOUTH DAILY 22  Yes Zohreh Shaw MD   carvedilol (COREG) 12.5 MG tablet Take 1 tablet by mouth 2 (Two) Times a Day With Meals. 21  Yes Zohreh Shaw MD   clopidogrel (PLAVIX) 75 MG tablet TAKE ONE TABLET BY MOUTH DAILY 22  Yes Zohreh Shaw MD   famotidine (PEPCID) 20 MG tablet Take 20 mg by mouth 2 (Two) Times a Day.   Yes Mookie Obrien MD   gabapentin (NEURONTIN) 800 MG tablet Take 1 tablet by mouth 3 (Three) Times a Day As Needed. 20  Yes Mookie Obrien MD   HYDROcodone-acetaminophen (NORCO)  MG per tablet Take 1 tablet by mouth 3 (Three) Times a Day As Needed. 20  Yes  "Mookie Obrien MD   isosorbide mononitrate (IMDUR) 60 MG 24 hr tablet TAKE ONE TABLET BY MOUTH DAILY 5/6/22  Yes Zohreh Shaw MD   lisinopril (PRINIVIL,ZESTRIL) 10 MG tablet TAKE ONE TABLET BY MOUTH DAILY 2/14/22  Yes Zohreh Shaw MD   metFORMIN (GLUCOPHAGE) 500 MG tablet TAKE ONE TABLET BY MOUTH TWICE A DAY WITH MEALS 4/12/22  Yes Zohreh Shaw MD   nitroglycerin (NITROSTAT) 0.4 MG SL tablet Place 0.4 mg under the tongue Every 5 (Five) Minutes As Needed for Chest Pain. Take no more than 3 doses in 15 minutes.   Yes Mookie Obrien MD   ranolazine (RANEXA) 1000 MG 12 hr tablet TAKE ONE TABLET BY MOUTH TWICE A DAY 3/14/22  Yes Zohreh Shaw MD   spironolactone-hydrochlorothiazide (ALDACTAZIDE) 25-25 MG tablet TAKE 1/2 TABLET BY MOUTH DAILY 9/20/21  Yes Zohreh Shaw MD   Tiotropium Bromide Monohydrate (SPIRIVA HANDIHALER IN) Inhale 1 puff Daily As Needed. 3/20/15  Yes Mookie Obrien MD   tiZANidine (ZANAFLEX) 4 MG tablet Take 1 tablet by mouth 2 (Two) Times a Day As Needed.   Yes Mookie Obrien MD   VENTOLIN  (90 BASE) MCG/ACT inhaler Inhale 2 puffs Every 4 (Four) Hours As Needed. 12/9/16  Yes Mooike Obrien MD   VITAMIN D PO Take  by mouth.   Yes Mookie Obrien MD         OBJECTIVE    /84 (BP Location: Left arm, Patient Position: Sitting, Cuff Size: Adult)   Pulse 79   Temp 97.5 °F (36.4 °C)   Ht 162.6 cm (64\")   Wt 108 kg (238 lb 6.4 oz)   SpO2 98%   BMI 40.92 kg/m²         Review of Systems     Constitutional:  Denies recent weight loss, weight gain, fever or chills, no change in exercise tolerance     HENT:  Denies any hearing loss, epistaxis, hoarseness, or difficulty speaking.     Eyes: Wears eyeglasses or contact lenses     Respiratory:  Denies dyspnea with exertion,no cough, wheezing, or hemoptysis.     Cardiovascular: Negative for palpations, chest pain, orthopnea, PND, peripheral edema, syncope, or claudication. "     Gastrointestinal:  Denies change in bowel habits, dyspepsia, ulcer disease, hematochezia, or melena.     Endocrine: Negative for cold intolerance, heat intolerance, polydipsia, polyphagia and polyuria. Denies any history of weight change, heat/cold intolerance, polydipsia, polyuria     Genitourinary: Negative.      Musculoskeletal: Denies any history of arthritic symptoms or back problems     Skin:  Denies any change in hair or nails, rashes, or skin lesions.     Allergic/Immunologic: Negative.  Negative for environmental allergies, food allergies and immunocompromised state.     Neurological:  Denies any history of recurrent headaches, strokes, TIA, or seizure disorder.     Hematological: Denies any food allergies, seasonal allergies, bleeding disorders, or lymphadenopathy.     Psychiatric/Behavioral: Denies any history of depression, substance abuse, or change in cognitive function.         Physical Exam     Constitutional: Cooperative, alert and oriented, well-developed, well-nourished, in no acute distress.     HENT:   Head: Normocephalic, normal hair patterns, no masses or tenderness.  Ears, Nose, and Throat: No gross abnormalities. No pallor or cyanosis. Dentition good.   Eyes: EOMS intact, PERRL, conjunctivae and lids unremarkable. Fundoscopic exam and visual fields not performed.   Neck: No palpable masses or adenopathy, no thyromegaly, no JVD, carotid pulses are full and equal bilaterally and without  Bruits.     Cardiovascular: Regular rhythm, S1 and S2 normal, no S3 or S4. Apical impulse not displaced. No murmurs, gallops, or rubs detected.     Pulmonary/Chest: Chest: normal symmetry, no tenderness to palpation, normal respiratory excursion, no intercostal retraction, no use of accessory muscles.            Pulmonary: Normal breath sounds. No rales or ronchi.    Abdominal: Abdomen soft, bowel sounds normoactive, no masses, no hepatosplenomegaly, non-tender, no bruits.     Musculoskeletal: No  deformities, clubbing, cyanosis, erythema, or edema observed. There are no spinal abnormalities noted. Normal muscle strength and tone. Pulses full and equal in all extremities, no bruits auscultated.     Neurological: No gross motor or sensory deficits noted, affect appropriate, oriented to time, person, place.     Skin: Warm and dry to the touch, no apparent skin lesions or masses noted.     Psychiatric: She has a normal mood and affect. Her behavior is normal. Judgment and thought content normal.         Procedures      Lab Results   Component Value Date    WBC 8.27 02/27/2020    HGB 11.7 (L) 03/06/2020    HCT 34.5 03/06/2020    MCV 87.9 02/27/2020     02/27/2020     Lab Results   Component Value Date    GLUCOSE 294 (H) 05/13/2021    BUN 20 05/13/2021    CREATININE 1.05 (H) 05/13/2021    EGFRIFNONA 54 (L) 05/13/2021    BCR 19.0 05/13/2021    CO2 22.1 05/13/2021    CALCIUM 9.0 05/13/2021    ALBUMIN 4.30 02/26/2020    AST 19 02/26/2020    ALT 24 02/26/2020     Lab Results   Component Value Date    CHOL 118 02/27/2020    CHOL 149 02/26/2020     Lab Results   Component Value Date    TRIG 99 02/27/2020    TRIG 116 02/26/2020    TRIG 184 03/03/2015     Lab Results   Component Value Date    HDL 40 02/27/2020    HDL 49 02/26/2020    HDL 33 (L) 03/03/2015     No components found for: LDLCALC  Lab Results   Component Value Date    LDL 58 02/27/2020    LDL 77 02/26/2020     03/03/2015     No results found for: HDLLDLRATIO  No components found for: CHOLHDL  Lab Results   Component Value Date    HGBA1C 7.10 (H) 02/26/2020     Lab Results   Component Value Date    TSH 1.100 06/26/2017           ASSESSMENT AND PLAN      Diagnoses and all orders for this visit:    1. Coronary artery disease of bypass graft of native heart with stable angina pectoris (HCC) (Primary)  -     Lipid Panel  -     Comprehensive Metabolic Panel    2. Mixed hyperlipidemia  -     Lipid Panel  -     Comprehensive Metabolic Panel    She is  doing quite well.  She is here for medicine refills.  We will go ahead and do a lipid panel as its been a while.  I have refilled all her medicines except for her lipid medicine and that will be done after her results.  She has been off of her antilipid she thinks for a while.  She has no other complaints cardiac wise.  She is on metformin which I will refill this 1 time and then her primary care will do it afterwards..  From her primary care physician the future    Class 3 Severe Obesity (BMI >=40). Obesity-related health conditions include the following: coronary heart disease. Obesity is unchanged. BMI is is above average; BMI management plan is completed. We discussed Will refer to primary care..    Lipids ok except for TG-refill atorvastatin.            Zohreh Shaw MD  5/16/2022  11:20 CDT

## 2022-05-17 RX ORDER — ATORVASTATIN CALCIUM 20 MG/1
20 TABLET, FILM COATED ORAL DAILY
Qty: 30 TABLET | Refills: 11 | Status: SHIPPED | OUTPATIENT
Start: 2022-05-17

## 2022-05-20 ENCOUNTER — TELEPHONE (OUTPATIENT)
Dept: CARDIOLOGY | Facility: CLINIC | Age: 57
End: 2022-05-20

## 2022-05-20 NOTE — TELEPHONE ENCOUNTER
Contacted PT per Dr. Shaw about results. Results are as follows; Lipids good w only slight elevation of TriGlycerides and Glucose.  HbA1c less than 6  I refilled her atorvastatin  PT voiced understanding.        ----- Message from Zohreh Shaw MD sent at 5/17/2022  7:46 AM CDT -----  Regarding: labs  Lipids good w only slight elevation of TriGlycerides and Glucose.     HbA1c less than 6     I refilled her atorvastatin  ----- Message -----  From: Lab, Background User  Sent: 5/16/2022   7:47 PM CDT  To: Zohreh Shaw MD

## 2022-05-20 NOTE — TELEPHONE ENCOUNTER
Contacted PT per Dr. Shaw about results. Results are as follows; Lipids good w only slight elevation of TriGlycerides and Glucose.  HbA1c less than 6  I refilled her atorvastatin  PT was unavailable and left vm.        ----- Message from Zohreh Shaw MD sent at 5/17/2022  7:46 AM CDT -----  Regarding: labs  Lipids good w only slight elevation of TriGlycerides and Glucose.    HbA1c less than 6    I refilled her atorvastatin  ----- Message -----  From: Lab, Background User  Sent: 5/16/2022   7:47 PM CDT  To: Zohreh Shaw MD

## 2022-06-15 RX ORDER — ISOSORBIDE MONONITRATE 60 MG/1
TABLET, EXTENDED RELEASE ORAL
Qty: 30 TABLET | Refills: 11 | Status: SHIPPED | OUTPATIENT
Start: 2022-06-15

## 2023-07-06 PROBLEM — E11.65 TYPE 2 DIABETES MELLITUS WITH HYPERGLYCEMIA, WITHOUT LONG-TERM CURRENT USE OF INSULIN: Status: ACTIVE | Noted: 2023-07-06

## 2023-07-06 PROBLEM — Z95.1 S/P CABG (CORONARY ARTERY BYPASS GRAFT): Status: ACTIVE | Noted: 2023-07-06

## 2023-07-28 RX ORDER — ISOSORBIDE MONONITRATE 60 MG/1
60 TABLET, EXTENDED RELEASE ORAL DAILY
Qty: 90 TABLET | Refills: 3 | Status: SHIPPED | OUTPATIENT
Start: 2023-07-28

## 2023-07-28 RX ORDER — METOPROLOL SUCCINATE 50 MG/1
50 TABLET, EXTENDED RELEASE ORAL DAILY
Qty: 90 TABLET | Refills: 3 | Status: SHIPPED | OUTPATIENT
Start: 2023-07-28

## 2023-08-30 RX ORDER — NITROGLYCERIN 0.4 MG/1
0.4 TABLET SUBLINGUAL
Qty: 30 TABLET | Refills: 3 | Status: SHIPPED | OUTPATIENT
Start: 2023-08-30

## 2023-09-12 ENCOUNTER — TELEPHONE (OUTPATIENT)
Dept: CARDIOLOGY | Facility: CLINIC | Age: 58
End: 2023-09-12
Payer: MEDICARE

## 2023-09-12 RX ORDER — RANOLAZINE 1000 MG/1
1 TABLET, EXTENDED RELEASE ORAL 2 TIMES DAILY
Qty: 180 TABLET | Refills: 3 | Status: SHIPPED | OUTPATIENT
Start: 2023-09-12

## 2023-09-12 NOTE — TELEPHONE ENCOUNTER
sent----- Message from Vania Lawson sent at 9/11/2023  1:20 PM CDT -----  Regarding: refill  She is needing a refill of Ranexa 1000 MG sent to [facility] in [place]. Thanks

## 2025-07-01 NOTE — TELEPHONE ENCOUNTER
Left message for patient to return my call so I can discuss her lab results.  Per Dr. Shaw, results are as follows:   Labs are ok    
show

## (undated) DEVICE — SUT PROLN 3/0 SH D/A 36IN 8522H

## (undated) DEVICE — GLV SURG SENSICARE GREEN W/ALOE PF LF 8 STRL

## (undated) DEVICE — SPNG DISSCT SECTO KTTNER PK/5

## (undated) DEVICE — DISPOSABLE BIPOLAR CODE, 12' (3.66 M): Brand: CONMED

## (undated) DEVICE — PREP PVP-I 7.5P BT 4OZ

## (undated) DEVICE — GLV SURG SENSICARE ALOE LF PF SZ7.5 GRN

## (undated) DEVICE — PREP SOL POVIDONE/IODINE BT 4OZ

## (undated) DEVICE — DEV INFL MONARCH 20ML

## (undated) DEVICE — GW PERIPH GUIDERIGHT STD/J/TP PTFE/PCOAT SS 0.038IN 5X150CM

## (undated) DEVICE — SHEET,DRAPE,53X77,STERILE: Brand: MEDLINE

## (undated) DEVICE — 6F .070 XB LAD 3.5 100CM: Brand: VISTA BRITE TIP

## (undated) DEVICE — BALN PTCA TAKERU RX 1.5X12MM

## (undated) DEVICE — SUT PERMAHAND SILK 3/0 SH1 18IN

## (undated) DEVICE — HI-TORQUE VERSACORE FLOPPY GUIDE WIRE SYSTEM 260 CM: Brand: HI-TORQUE VERSACORE

## (undated) DEVICE — CATH DIAG EXPO .056 IM 6F 100CM

## (undated) DEVICE — DRN PENRS 1/4X18IN LTX

## (undated) DEVICE — COPILOT KIT INCLUDES BLEEDBACK CONTROL VALVE / GUIDE WIRE INTRODUCER / TORQUE DEVICE: Brand: ACCESSORIES

## (undated) DEVICE — ADHS LIQ MASTISOL 2/3ML

## (undated) DEVICE — MODEL BT2000 P/N 700287-012KIT CONTENTS: MANIFOLD WITH SALINE AND CONTRAST PORTS, SALINE TUBING WITH SPIKE AND HAND SYRINGE, TRANSDUCER: Brand: BT2000 AUTOMATED MANIFOLD KIT

## (undated) DEVICE — SUT SILK 2/0 FS BLK 18IN 685G

## (undated) DEVICE — SUT VICRYL 3-0 SH-1 PO 18IN J772D

## (undated) DEVICE — DRSNG TELFA PAD NONADH STR 1S 3X8IN

## (undated) DEVICE — PK MAJ PROC LF 60

## (undated) DEVICE — GLV SURG SENSICARE GREEN W/ALOE PF LF 7 STRL

## (undated) DEVICE — GOWN,AURORA,NOREINF,RAGLAN,XL,STERILE: Brand: MEDLINE

## (undated) DEVICE — GLV SURG SENSICARE GREEN W/ALOE PF LF 6 STRL

## (undated) DEVICE — HI-TORQUE BALANCE MIDDLEWEIGHT GUIDE WIRE .014 J TIP 3.0 CM X 190 CM: Brand: HI-TORQUE BALANCE MIDDLEWEIGHT

## (undated) DEVICE — CONTAINER,SPECIMEN,OR STERILE,4OZ: Brand: MEDLINE

## (undated) DEVICE — 3M™ STERI-STRIP™ REINFORCED ADHESIVE SKIN CLOSURES, R1547, 1/2 IN X 4 IN (12 MM X 100 MM), 6 STRIPS/ENVELOPE: Brand: 3M™ STERI-STRIP™

## (undated) DEVICE — PK CATH LAB 60

## (undated) DEVICE — SUT VIC 3/0 TIES 18IN J110T

## (undated) DEVICE — MINI TREK™ II CORONARY DILATATION CATHETER 2.00 MM X 15 MM / OVER-THE-WIRE: Brand: MINI TREK™

## (undated) DEVICE — GLV SURG TRIUMPH LT PF LTX 7.5 STRL

## (undated) DEVICE — PINNACLE INTRODUCER SHEATH: Brand: PINNACLE

## (undated) DEVICE — INTENDED TO BE USED TO OCCLUDE, RETRACT AND IDENTIFY ARTERIES, VEINS, TENDONS AND NERVES IN SURGICAL PROCEDURES: Brand: STERION®  VESSEL LOOP

## (undated) DEVICE — ARTERIAL NEEDLE: Brand: UNBRANDED

## (undated) DEVICE — SPNG GZ WOVN 4X4IN 12PLY 10/BX STRL

## (undated) DEVICE — STPLR SKIN VISISTAT WD 35CT

## (undated) DEVICE — SUT SILK 4/0 18IN SA63H

## (undated) DEVICE — SUT PROLN 4/0 RB1 D/A 36IN 8557H

## (undated) DEVICE — ELECTRODE,RT,STRESS,FOAM,50PK: Brand: MEDLINE

## (undated) DEVICE — GLV SURG TRIUMPH PF LTX 6.5 STRL

## (undated) DEVICE — SUT SILK 3-0 TIES 18IN SA64H

## (undated) DEVICE — TOWEL,OR,DSP,ST,BLUE,DLX,4/PK,20PK/CS: Brand: MEDLINE

## (undated) DEVICE — CATH DIAG IMPULSE M/ PK 145 5FR

## (undated) DEVICE — INTRO SHEATH ART/FEM ENGAGE .038 6F12CM

## (undated) DEVICE — POSTN HD RING CUSH 9IN LF

## (undated) DEVICE — HI-TORQUE BALANCE MIDDLEWEIGHT GUIDE WIRE W/HYDROCOAT .014 J TIP 3.0 CM X 300 CM: Brand: HI-TORQUE BALANCE MIDDLEWEIGHT

## (undated) DEVICE — GLV SURG SENSICARE GREEN W/ALOE PF LF 6.5 STRL

## (undated) DEVICE — SOL IRR NACL 0.9PCT BT 1000ML

## (undated) DEVICE — SYS COMPR FEMOSTOP GLD W/PUMP LF

## (undated) DEVICE — ANTIBACTERIAL UNDYED BRAIDED (POLYGLACTIN 910), SYNTHETIC ABSORBABLE SUTURE: Brand: COATED VICRYL

## (undated) DEVICE — ANGIO-SEAL VIP VASCULAR CLOSURE DEVICE: Brand: ANGIO-SEAL

## (undated) DEVICE — USE OF THE SMARTNEEDLE DEVICE IS INDICATED WHEN BLOOD FLOW MUST BE DETECTED FOR PERCUTANEOUS VESSEL CANNULATION. THE VESSEL MUST BE OF A CALIBER WHICH WOULD NORMALLY BE PUNCTURED WITH A NEEDLE AND/OR CATHETER OF THIS SIZE OR LARGER.: Brand: SMARTNEEDLE® VASCULAR ACCESS SYSTEM

## (undated) DEVICE — GLV SURG TRIUMPH LT PF LTX 6.5 STRL

## (undated) DEVICE — BLD TONG INDIV/WRP A/ 6IN STRL

## (undated) DEVICE — 6F .070 XB LAD 4 100CM: Brand: VISTA BRITE TIP